# Patient Record
Sex: FEMALE | Race: WHITE | NOT HISPANIC OR LATINO | Employment: OTHER | URBAN - METROPOLITAN AREA
[De-identification: names, ages, dates, MRNs, and addresses within clinical notes are randomized per-mention and may not be internally consistent; named-entity substitution may affect disease eponyms.]

---

## 2017-01-01 ENCOUNTER — APPOINTMENT (OUTPATIENT)
Dept: GENERAL RADIOLOGY | Facility: HOSPITAL | Age: 82
End: 2017-01-01

## 2017-01-01 ENCOUNTER — APPOINTMENT (OUTPATIENT)
Dept: MRI IMAGING | Facility: HOSPITAL | Age: 82
End: 2017-01-01

## 2017-01-01 ENCOUNTER — ANESTHESIA EVENT (OUTPATIENT)
Dept: PERIOP | Facility: HOSPITAL | Age: 82
End: 2017-01-01

## 2017-01-01 ENCOUNTER — APPOINTMENT (OUTPATIENT)
Dept: CARDIOLOGY | Facility: HOSPITAL | Age: 82
End: 2017-01-01
Attending: SURGERY

## 2017-01-01 ENCOUNTER — APPOINTMENT (OUTPATIENT)
Dept: CT IMAGING | Facility: HOSPITAL | Age: 82
End: 2017-01-01

## 2017-01-01 ENCOUNTER — APPOINTMENT (OUTPATIENT)
Dept: NEUROLOGY | Facility: HOSPITAL | Age: 82
End: 2017-01-01
Attending: INTERNAL MEDICINE

## 2017-01-01 ENCOUNTER — ANESTHESIA (OUTPATIENT)
Dept: PERIOP | Facility: HOSPITAL | Age: 82
End: 2017-01-01

## 2017-01-01 ENCOUNTER — APPOINTMENT (OUTPATIENT)
Dept: GENERAL RADIOLOGY | Facility: HOSPITAL | Age: 82
End: 2017-01-01
Attending: PSYCHIATRY & NEUROLOGY

## 2017-01-01 ENCOUNTER — HOSPITAL ENCOUNTER (INPATIENT)
Facility: HOSPITAL | Age: 82
LOS: 7 days | End: 2017-11-06
Attending: EMERGENCY MEDICINE | Admitting: INTERNAL MEDICINE

## 2017-01-01 VITALS
HEIGHT: 64 IN | TEMPERATURE: 98.2 F | OXYGEN SATURATION: 88 % | DIASTOLIC BLOOD PRESSURE: 69 MMHG | SYSTOLIC BLOOD PRESSURE: 120 MMHG | BODY MASS INDEX: 25.33 KG/M2 | WEIGHT: 148.37 LBS

## 2017-01-01 DIAGNOSIS — A41.9 SEPSIS, DUE TO UNSPECIFIED ORGANISM: ICD-10-CM

## 2017-01-01 DIAGNOSIS — N17.9 ACUTE RENAL FAILURE, UNSPECIFIED ACUTE RENAL FAILURE TYPE (HCC): ICD-10-CM

## 2017-01-01 DIAGNOSIS — J96.01 ACUTE RESPIRATORY FAILURE WITH HYPOXIA (HCC): Primary | ICD-10-CM

## 2017-01-01 DIAGNOSIS — K43.6 INCARCERATED VENTRAL HERNIA: ICD-10-CM

## 2017-01-01 DIAGNOSIS — R53.1 GENERALIZED WEAKNESS: ICD-10-CM

## 2017-01-01 LAB
ALBUMIN SERPL-MCNC: 2.3 G/DL (ref 3.5–5.2)
ALBUMIN SERPL-MCNC: 2.3 G/DL (ref 3.5–5.2)
ALBUMIN SERPL-MCNC: 2.4 G/DL (ref 3.5–5.2)
ALBUMIN SERPL-MCNC: 2.5 G/DL (ref 3.5–5.2)
ALBUMIN SERPL-MCNC: 3.7 G/DL (ref 3.5–5.2)
ALBUMIN/GLOB SERPL: 0.7 G/DL
ALBUMIN/GLOB SERPL: 0.9 G/DL
ALBUMIN/GLOB SERPL: 1 G/DL
ALP SERPL-CCNC: 50 U/L (ref 39–117)
ALP SERPL-CCNC: 53 U/L (ref 39–117)
ALP SERPL-CCNC: 53 U/L (ref 39–117)
ALP SERPL-CCNC: 64 U/L (ref 39–117)
ALP SERPL-CCNC: 79 U/L (ref 39–117)
ALT SERPL W P-5'-P-CCNC: 124 U/L (ref 1–33)
ALT SERPL W P-5'-P-CCNC: 239 U/L (ref 1–33)
ALT SERPL W P-5'-P-CCNC: 36 U/L (ref 1–33)
ALT SERPL W P-5'-P-CCNC: 48 U/L (ref 1–33)
ALT SERPL W P-5'-P-CCNC: 69 U/L (ref 1–33)
ANION GAP SERPL CALCULATED.3IONS-SCNC: 13.7 MMOL/L
ANION GAP SERPL CALCULATED.3IONS-SCNC: 15 MMOL/L
ANION GAP SERPL CALCULATED.3IONS-SCNC: 18.1 MMOL/L
ANION GAP SERPL CALCULATED.3IONS-SCNC: 19.5 MMOL/L
ANION GAP SERPL CALCULATED.3IONS-SCNC: 21.4 MMOL/L
APPEARANCE CSF: CLEAR
ARTERIAL PATENCY WRIST A: ABNORMAL
ARTERIAL PATENCY WRIST A: POSITIVE
ARTERIAL PATENCY WRIST A: POSITIVE
AST SERPL-CCNC: 107 U/L (ref 1–32)
AST SERPL-CCNC: 28 U/L (ref 1–32)
AST SERPL-CCNC: 31 U/L (ref 1–32)
AST SERPL-CCNC: 36 U/L (ref 1–32)
AST SERPL-CCNC: 37 U/L (ref 1–32)
ATMOSPHERIC PRESS: 744.8 MMHG
ATMOSPHERIC PRESS: 750.4 MMHG
ATMOSPHERIC PRESS: 753.9 MMHG
BACTERIA BLD CULT: ABNORMAL
BACTERIA SPEC AEROBE CULT: ABNORMAL
BACTERIA SPEC AEROBE CULT: NORMAL
BACTERIA SPEC AEROBE CULT: NORMAL
BACTERIA UR QL AUTO: ABNORMAL /HPF
BACTERIA UR QL AUTO: ABNORMAL /HPF
BASE EXCESS BLDA CALC-SCNC: -3.3 MMOL/L (ref 0–2)
BASE EXCESS BLDA CALC-SCNC: -4.8 MMOL/L (ref 0–2)
BASE EXCESS BLDA CALC-SCNC: 6 MMOL/L (ref -5–5)
BASE EXCESS BLDA CALC-SCNC: 7.7 MMOL/L (ref 0–2)
BASOPHILS # BLD AUTO: 0.01 10*3/MM3 (ref 0–0.2)
BASOPHILS # BLD AUTO: 0.02 10*3/MM3 (ref 0–0.2)
BASOPHILS # BLD AUTO: 0.04 10*3/MM3 (ref 0–0.2)
BASOPHILS NFR BLD AUTO: 0.1 % (ref 0–1.5)
BASOPHILS NFR BLD AUTO: 0.2 % (ref 0–1.5)
BASOPHILS NFR BLD AUTO: 0.4 % (ref 0–1.5)
BDY SITE: ABNORMAL
BH CV ECHO MEAS - AO MEAN PG (FULL): 14 MMHG
BH CV ECHO MEAS - AO MEAN PG: 18 MMHG
BH CV ECHO MEAS - AO ROOT AREA (BSA CORRECTED): 1.9
BH CV ECHO MEAS - AO ROOT AREA: 8.6 CM^2
BH CV ECHO MEAS - AO ROOT DIAM: 3.3 CM
BH CV ECHO MEAS - AO V2 MEAN: 187 CM/SEC
BH CV ECHO MEAS - AO V2 VTI: 24.2 CM
BH CV ECHO MEAS - ASC AORTA: 3.7 CM
BH CV ECHO MEAS - AVA(I,A): 1.9 CM^2
BH CV ECHO MEAS - AVA(I,D): 1.9 CM^2
BH CV ECHO MEAS - BSA(HAYCOCK): 1.7 M^2
BH CV ECHO MEAS - BSA: 1.7 M^2
BH CV ECHO MEAS - BZI_BMI: 24.9 KILOGRAMS/M^2
BH CV ECHO MEAS - BZI_METRIC_HEIGHT: 162.6 CM
BH CV ECHO MEAS - BZI_METRIC_WEIGHT: 65.8 KG
BH CV ECHO MEAS - CONTRAST EF (2CH): 71.4 ML/M^2
BH CV ECHO MEAS - CONTRAST EF 4CH: 68.6 ML/M^2
BH CV ECHO MEAS - EDV(CUBED): 29.8 ML
BH CV ECHO MEAS - EDV(MOD-SP2): 42 ML
BH CV ECHO MEAS - EDV(MOD-SP4): 51 ML
BH CV ECHO MEAS - EDV(TEICH): 37.9 ML
BH CV ECHO MEAS - EF(CUBED): 83.5 %
BH CV ECHO MEAS - EF(MOD-SP2): 71.4 %
BH CV ECHO MEAS - EF(MOD-SP4): 68.6 %
BH CV ECHO MEAS - EF(TEICH): 77.9 %
BH CV ECHO MEAS - ESV(CUBED): 4.9 ML
BH CV ECHO MEAS - ESV(MOD-SP2): 12 ML
BH CV ECHO MEAS - ESV(MOD-SP4): 16 ML
BH CV ECHO MEAS - ESV(TEICH): 8.4 ML
BH CV ECHO MEAS - FS: 45.2 %
BH CV ECHO MEAS - IVS/LVPW: 1.1
BH CV ECHO MEAS - IVSD: 1.1 CM
BH CV ECHO MEAS - LAT PEAK E' VEL: 6 CM/SEC
BH CV ECHO MEAS - LV DIASTOLIC VOL/BSA (35-75): 29.9 ML/M^2
BH CV ECHO MEAS - LV MASS(C)D: 92.8 GRAMS
BH CV ECHO MEAS - LV MASS(C)DI: 54.4 GRAMS/M^2
BH CV ECHO MEAS - LV MEAN PG: 4 MMHG
BH CV ECHO MEAS - LV SYSTOLIC VOL/BSA (12-30): 9.4 ML/M^2
BH CV ECHO MEAS - LV V1 MEAN: 96.1 CM/SEC
BH CV ECHO MEAS - LV V1 VTI: 14.4 CM
BH CV ECHO MEAS - LVIDD: 3.1 CM
BH CV ECHO MEAS - LVIDS: 1.7 CM
BH CV ECHO MEAS - LVLD AP2: 8 CM
BH CV ECHO MEAS - LVLD AP4: 7.3 CM
BH CV ECHO MEAS - LVLS AP2: 6.1 CM
BH CV ECHO MEAS - LVLS AP4: 5.9 CM
BH CV ECHO MEAS - LVOT AREA (M): 3.1 CM^2
BH CV ECHO MEAS - LVOT AREA: 3.1 CM^2
BH CV ECHO MEAS - LVOT DIAM: 2 CM
BH CV ECHO MEAS - LVPWD: 1 CM
BH CV ECHO MEAS - MED PEAK E' VEL: 3 CM/SEC
BH CV ECHO MEAS - MV A DUR: 0.09 SEC
BH CV ECHO MEAS - MV A MAX VEL: 116 CM/SEC
BH CV ECHO MEAS - MV DEC SLOPE: 200 CM/SEC^2
BH CV ECHO MEAS - MV DEC TIME: 0.18 SEC
BH CV ECHO MEAS - MV E MAX VEL: 41 CM/SEC
BH CV ECHO MEAS - MV E/A: 0.35
BH CV ECHO MEAS - MV MEAN PG: 3 MMHG
BH CV ECHO MEAS - MV P1/2T MAX VEL: 51.6 CM/SEC
BH CV ECHO MEAS - MV P1/2T: 75.6 MSEC
BH CV ECHO MEAS - MV V2 MEAN: 79 CM/SEC
BH CV ECHO MEAS - MV V2 VTI: 20.5 CM
BH CV ECHO MEAS - MVA P1/2T LCG: 4.3 CM^2
BH CV ECHO MEAS - MVA(P1/2T): 2.9 CM^2
BH CV ECHO MEAS - MVA(VTI): 2.2 CM^2
BH CV ECHO MEAS - SI(AO): 121.3 ML/M^2
BH CV ECHO MEAS - SI(CUBED): 14.6 ML/M^2
BH CV ECHO MEAS - SI(LVOT): 26.5 ML/M^2
BH CV ECHO MEAS - SI(MOD-SP2): 17.6 ML/M^2
BH CV ECHO MEAS - SI(MOD-SP4): 20.5 ML/M^2
BH CV ECHO MEAS - SI(TEICH): 17.3 ML/M^2
BH CV ECHO MEAS - SV(AO): 207 ML
BH CV ECHO MEAS - SV(CUBED): 24.9 ML
BH CV ECHO MEAS - SV(LVOT): 45.2 ML
BH CV ECHO MEAS - SV(MOD-SP2): 30 ML
BH CV ECHO MEAS - SV(MOD-SP4): 35 ML
BH CV ECHO MEAS - SV(TEICH): 29.5 ML
BH CV XLRA - RV BASE: 1.8 CM
BH CV XLRA - TDI S': 11 CM/SEC
BILIRUB SERPL-MCNC: 0.6 MG/DL (ref 0.1–1.2)
BILIRUB SERPL-MCNC: 0.9 MG/DL (ref 0.1–1.2)
BILIRUB SERPL-MCNC: 1.2 MG/DL (ref 0.1–1.2)
BILIRUB SERPL-MCNC: 1.7 MG/DL (ref 0.1–1.2)
BILIRUB SERPL-MCNC: 1.8 MG/DL (ref 0.1–1.2)
BILIRUB UR QL STRIP: NEGATIVE
BILIRUB UR QL STRIP: NEGATIVE
BUN BLD-MCNC: 69 MG/DL (ref 8–23)
BUN BLD-MCNC: 81 MG/DL (ref 8–23)
BUN BLD-MCNC: 81 MG/DL (ref 8–23)
BUN BLD-MCNC: 83 MG/DL (ref 8–23)
BUN BLD-MCNC: 87 MG/DL (ref 8–23)
BUN/CREAT SERPL: 31.4 (ref 7–25)
BUN/CREAT SERPL: 32.5 (ref 7–25)
BUN/CREAT SERPL: 33.8 (ref 7–25)
BUN/CREAT SERPL: 34.9 (ref 7–25)
BUN/CREAT SERPL: 45.4 (ref 7–25)
CALCIUM SPEC-SCNC: 6.7 MG/DL (ref 8.6–10.5)
CALCIUM SPEC-SCNC: 6.9 MG/DL (ref 8.6–10.5)
CALCIUM SPEC-SCNC: 7.1 MG/DL (ref 8.6–10.5)
CALCIUM SPEC-SCNC: 7.2 MG/DL (ref 8.6–10.5)
CALCIUM SPEC-SCNC: 8.9 MG/DL (ref 8.6–10.5)
CHLORIDE SERPL-SCNC: 102 MMOL/L (ref 98–107)
CHLORIDE SERPL-SCNC: 109 MMOL/L (ref 98–107)
CHLORIDE SERPL-SCNC: 79 MMOL/L (ref 98–107)
CHLORIDE SERPL-SCNC: 91 MMOL/L (ref 98–107)
CHLORIDE SERPL-SCNC: 95 MMOL/L (ref 98–107)
CLARITY UR: ABNORMAL
CLARITY UR: CLEAR
CO2 BLDA-SCNC: 32 MMOL/L (ref 24–29)
CO2 SERPL-SCNC: 20.9 MMOL/L (ref 22–29)
CO2 SERPL-SCNC: 21.3 MMOL/L (ref 22–29)
CO2 SERPL-SCNC: 22.5 MMOL/L (ref 22–29)
CO2 SERPL-SCNC: 27 MMOL/L (ref 22–29)
CO2 SERPL-SCNC: 27.6 MMOL/L (ref 22–29)
COLOR CSF: COLORLESS
COLOR UR: ABNORMAL
COLOR UR: ABNORMAL
CREAT BLD-MCNC: 1.52 MG/DL (ref 0.57–1)
CREAT BLD-MCNC: 2.32 MG/DL (ref 0.57–1)
CREAT BLD-MCNC: 2.4 MG/DL (ref 0.57–1)
CREAT BLD-MCNC: 2.55 MG/DL (ref 0.57–1)
CREAT BLD-MCNC: 2.77 MG/DL (ref 0.57–1)
CREAT UR-MCNC: 204.5 MG/DL
CYTO UR: NORMAL
D DIMER PPP FEU-MCNC: 11.63 MCGFEU/ML (ref 0–0.49)
D-LACTATE SERPL-SCNC: 1.8 MMOL/L (ref 0.5–2)
D-LACTATE SERPL-SCNC: 1.8 MMOL/L (ref 0.5–2)
D-LACTATE SERPL-SCNC: 2.5 MMOL/L (ref 0.5–2)
D-LACTATE SERPL-SCNC: 2.5 MMOL/L (ref 0.5–2)
D-LACTATE SERPL-SCNC: 2.9 MMOL/L (ref 0.5–2)
DEPRECATED RDW RBC AUTO: 40.7 FL (ref 37–54)
DEPRECATED RDW RBC AUTO: 42.9 FL (ref 37–54)
DEPRECATED RDW RBC AUTO: 44.4 FL (ref 37–54)
DEPRECATED RDW RBC AUTO: 45.7 FL (ref 37–54)
DEPRECATED RDW RBC AUTO: 48.5 FL (ref 37–54)
E/E' RATIO: 10
EOSINOPHIL # BLD AUTO: 0 10*3/MM3 (ref 0–0.7)
EOSINOPHIL # BLD AUTO: 0.01 10*3/MM3 (ref 0–0.7)
EOSINOPHIL # BLD AUTO: 0.02 10*3/MM3 (ref 0–0.7)
EOSINOPHIL NFR BLD AUTO: 0 % (ref 0.3–6.2)
EOSINOPHIL NFR BLD AUTO: 0.1 % (ref 0.3–6.2)
EOSINOPHIL NFR BLD AUTO: 0.2 % (ref 0.3–6.2)
ERYTHROCYTE [DISTWIDTH] IN BLOOD BY AUTOMATED COUNT: 12.3 % (ref 11.7–13)
ERYTHROCYTE [DISTWIDTH] IN BLOOD BY AUTOMATED COUNT: 12.5 % (ref 11.7–13)
ERYTHROCYTE [DISTWIDTH] IN BLOOD BY AUTOMATED COUNT: 12.9 % (ref 11.7–13)
ERYTHROCYTE [DISTWIDTH] IN BLOOD BY AUTOMATED COUNT: 13.2 % (ref 11.7–13)
ERYTHROCYTE [DISTWIDTH] IN BLOOD BY AUTOMATED COUNT: 13.6 % (ref 11.7–13)
GFR SERPL CREATININE-BSD FRML MDRD: 16 ML/MIN/1.73
GFR SERPL CREATININE-BSD FRML MDRD: 18 ML/MIN/1.73
GFR SERPL CREATININE-BSD FRML MDRD: 19 ML/MIN/1.73
GFR SERPL CREATININE-BSD FRML MDRD: 20 ML/MIN/1.73
GFR SERPL CREATININE-BSD FRML MDRD: 22 ML/MIN/1.73
GFR SERPL CREATININE-BSD FRML MDRD: 33 ML/MIN/1.73
GLOBULIN UR ELPH-MCNC: 2.9 GM/DL
GLOBULIN UR ELPH-MCNC: 3.4 GM/DL
GLOBULIN UR ELPH-MCNC: 3.4 GM/DL
GLOBULIN UR ELPH-MCNC: 3.5 GM/DL
GLOBULIN UR ELPH-MCNC: 3.7 GM/DL
GLUCOSE BLD-MCNC: 111 MG/DL (ref 65–99)
GLUCOSE BLD-MCNC: 120 MG/DL (ref 65–99)
GLUCOSE BLD-MCNC: 137 MG/DL (ref 65–99)
GLUCOSE BLD-MCNC: 138 MG/DL (ref 65–99)
GLUCOSE BLD-MCNC: 250 MG/DL (ref 65–99)
GLUCOSE BLDC GLUCOMTR-MCNC: 102 MG/DL (ref 70–130)
GLUCOSE BLDC GLUCOMTR-MCNC: 103 MG/DL (ref 70–130)
GLUCOSE BLDC GLUCOMTR-MCNC: 104 MG/DL (ref 70–130)
GLUCOSE BLDC GLUCOMTR-MCNC: 104 MG/DL (ref 70–130)
GLUCOSE BLDC GLUCOMTR-MCNC: 106 MG/DL (ref 70–130)
GLUCOSE BLDC GLUCOMTR-MCNC: 107 MG/DL (ref 70–130)
GLUCOSE BLDC GLUCOMTR-MCNC: 114 MG/DL (ref 70–130)
GLUCOSE BLDC GLUCOMTR-MCNC: 117 MG/DL (ref 70–130)
GLUCOSE BLDC GLUCOMTR-MCNC: 119 MG/DL (ref 70–130)
GLUCOSE BLDC GLUCOMTR-MCNC: 121 MG/DL (ref 70–130)
GLUCOSE BLDC GLUCOMTR-MCNC: 121 MG/DL (ref 70–130)
GLUCOSE BLDC GLUCOMTR-MCNC: 122 MG/DL (ref 70–130)
GLUCOSE BLDC GLUCOMTR-MCNC: 127 MG/DL (ref 70–130)
GLUCOSE BLDC GLUCOMTR-MCNC: 129 MG/DL (ref 70–130)
GLUCOSE BLDC GLUCOMTR-MCNC: 130 MG/DL (ref 70–130)
GLUCOSE BLDC GLUCOMTR-MCNC: 131 MG/DL (ref 70–130)
GLUCOSE BLDC GLUCOMTR-MCNC: 135 MG/DL (ref 70–130)
GLUCOSE BLDC GLUCOMTR-MCNC: 137 MG/DL (ref 70–130)
GLUCOSE BLDC GLUCOMTR-MCNC: 143 MG/DL (ref 70–130)
GLUCOSE BLDC GLUCOMTR-MCNC: 174 MG/DL (ref 70–130)
GLUCOSE BLDC GLUCOMTR-MCNC: 181 MG/DL (ref 70–130)
GLUCOSE BLDC GLUCOMTR-MCNC: 93 MG/DL (ref 70–130)
GLUCOSE BLDC GLUCOMTR-MCNC: 96 MG/DL (ref 70–130)
GLUCOSE CSF-MCNC: 90 MG/DL (ref 40–70)
GLUCOSE UR STRIP-MCNC: NEGATIVE MG/DL
GLUCOSE UR STRIP-MCNC: NEGATIVE MG/DL
GRAM STN SPEC: ABNORMAL
GRAM STN SPEC: NORMAL
HBA1C MFR BLD: 5.7 % (ref 4.8–5.6)
HCO3 BLDA-SCNC: 18.4 MMOL/L (ref 22–28)
HCO3 BLDA-SCNC: 19.7 MMOL/L (ref 22–28)
HCO3 BLDA-SCNC: 30 MMOL/L (ref 22–28)
HCO3 BLDA-SCNC: 30.8 MMOL/L (ref 22–26)
HCT VFR BLD AUTO: 42.1 % (ref 35.6–45.5)
HCT VFR BLD AUTO: 42.3 % (ref 35.6–45.5)
HCT VFR BLD AUTO: 45.5 % (ref 35.6–45.5)
HCT VFR BLD AUTO: 51.1 % (ref 35.6–45.5)
HCT VFR BLD AUTO: 57.4 % (ref 35.6–45.5)
HCT VFR BLDA CALC: 45 % (ref 38–51)
HGB BLD-MCNC: 12.9 G/DL (ref 11.9–15.5)
HGB BLD-MCNC: 13.6 G/DL (ref 11.9–15.5)
HGB BLD-MCNC: 14.9 G/DL (ref 11.9–15.5)
HGB BLD-MCNC: 16.9 G/DL (ref 11.9–15.5)
HGB BLD-MCNC: 19.6 G/DL (ref 11.9–15.5)
HGB BLDA-MCNC: 15.3 G/DL (ref 12–17)
HGB UR QL STRIP.AUTO: ABNORMAL
HGB UR QL STRIP.AUTO: ABNORMAL
HOLD SPECIMEN: NORMAL
HOROWITZ INDEX BLD+IHG-RTO: 100 %
HOROWITZ INDEX BLD+IHG-RTO: 100 %
HOROWITZ INDEX BLD+IHG-RTO: 40 %
HYALINE CASTS UR QL AUTO: ABNORMAL /LPF
HYALINE CASTS UR QL AUTO: ABNORMAL /LPF
IMM GRANULOCYTES # BLD: 0.03 10*3/MM3 (ref 0–0.03)
IMM GRANULOCYTES # BLD: 0.05 10*3/MM3 (ref 0–0.03)
IMM GRANULOCYTES # BLD: 0.06 10*3/MM3 (ref 0–0.03)
IMM GRANULOCYTES NFR BLD: 0.3 % (ref 0–0.5)
IMM GRANULOCYTES NFR BLD: 0.4 % (ref 0–0.5)
IMM GRANULOCYTES NFR BLD: 0.5 % (ref 0–0.5)
INR PPP: 1.08 (ref 0.9–1.1)
ISOLATED FROM: ABNORMAL
KETONES UR QL STRIP: ABNORMAL
KETONES UR QL STRIP: ABNORMAL
LAB AP CASE REPORT: NORMAL
LEFT ATRIUM VOLUME INDEX: 12 ML/M2
LEUKOCYTE ESTERASE UR QL STRIP.AUTO: ABNORMAL
LEUKOCYTE ESTERASE UR QL STRIP.AUTO: NEGATIVE
LYMPHOCYTES # BLD AUTO: 0.44 10*3/MM3 (ref 0.9–4.8)
LYMPHOCYTES # BLD AUTO: 0.84 10*3/MM3 (ref 0.9–4.8)
LYMPHOCYTES # BLD AUTO: 1 10*3/MM3 (ref 0.9–4.8)
LYMPHOCYTES NFR BLD AUTO: 4.6 % (ref 19.6–45.3)
LYMPHOCYTES NFR BLD AUTO: 7 % (ref 19.6–45.3)
LYMPHOCYTES NFR BLD AUTO: 7.5 % (ref 19.6–45.3)
Lab: NORMAL
MAGNESIUM SERPL-MCNC: 4 MG/DL (ref 1.6–2.4)
MAGNESIUM SERPL-MCNC: 5.5 MG/DL (ref 1.6–2.4)
MAGNESIUM SERPL-MCNC: 5.5 MG/DL (ref 1.6–2.4)
MAXIMAL PREDICTED HEART RATE: 138 BPM
MCH RBC QN AUTO: 30 PG (ref 26.9–32)
MCH RBC QN AUTO: 30.4 PG (ref 26.9–32)
MCH RBC QN AUTO: 30.7 PG (ref 26.9–32)
MCH RBC QN AUTO: 30.8 PG (ref 26.9–32)
MCH RBC QN AUTO: 31 PG (ref 26.9–32)
MCHC RBC AUTO-ENTMCNC: 30.6 G/DL (ref 32.4–36.3)
MCHC RBC AUTO-ENTMCNC: 32.2 G/DL (ref 32.4–36.3)
MCHC RBC AUTO-ENTMCNC: 32.7 G/DL (ref 32.4–36.3)
MCHC RBC AUTO-ENTMCNC: 33.1 G/DL (ref 32.4–36.3)
MCHC RBC AUTO-ENTMCNC: 34.1 G/DL (ref 32.4–36.3)
MCV RBC AUTO: 90.8 FL (ref 80.5–98.2)
MCV RBC AUTO: 93.2 FL (ref 80.5–98.2)
MCV RBC AUTO: 93.8 FL (ref 80.5–98.2)
MCV RBC AUTO: 94.4 FL (ref 80.5–98.2)
MCV RBC AUTO: 97.9 FL (ref 80.5–98.2)
METHOD: ABNORMAL
MODALITY: ABNORMAL
MONOCYTES # BLD AUTO: 0.93 10*3/MM3 (ref 0.2–1.2)
MONOCYTES # BLD AUTO: 1.03 10*3/MM3 (ref 0.2–1.2)
MONOCYTES # BLD AUTO: 1.19 10*3/MM3 (ref 0.2–1.2)
MONOCYTES NFR BLD AUTO: 12.5 % (ref 5–12)
MONOCYTES NFR BLD AUTO: 7 % (ref 5–12)
MONOCYTES NFR BLD AUTO: 8.6 % (ref 5–12)
NEUTROPHILS # BLD AUTO: 10.01 10*3/MM3 (ref 1.9–8.1)
NEUTROPHILS # BLD AUTO: 11.31 10*3/MM3 (ref 1.9–8.1)
NEUTROPHILS # BLD AUTO: 7.83 10*3/MM3 (ref 1.9–8.1)
NEUTROPHILS NFR BLD AUTO: 82.1 % (ref 42.7–76)
NEUTROPHILS NFR BLD AUTO: 83.8 % (ref 42.7–76)
NEUTROPHILS NFR BLD AUTO: 84.7 % (ref 42.7–76)
NITRITE UR QL STRIP: NEGATIVE
NITRITE UR QL STRIP: NEGATIVE
NRBC BLD MANUAL-RTO: 0 /100 WBC (ref 0–0)
NT-PROBNP SERPL-MCNC: 3439 PG/ML (ref 0–1800)
NUC CELL # CSF MANUAL: 0 /MM3 (ref 0–5)
O2 A-A PPRESDIFF RESPIRATORY: 0.1 MMHG
O2 A-A PPRESDIFF RESPIRATORY: 0.3 MMHG
O2 A-A PPRESDIFF RESPIRATORY: 0.3 MMHG
PATH REPORT.FINAL DX SPEC: NORMAL
PATH REPORT.GROSS SPEC: NORMAL
PCO2 BLDA: 27.4 MM HG (ref 35–45)
PCO2 BLDA: 29.7 MM HG (ref 35–45)
PCO2 BLDA: 34.3 MM HG (ref 35–45)
PCO2 BLDA: 47.6 MM HG (ref 35–45)
PEEP RESPIRATORY: 5 CM[H2O]
PEEP RESPIRATORY: 7 CM[H2O]
PH BLDA: 7.42 PH UNITS (ref 7.35–7.6)
PH BLDA: 7.43 PH UNITS (ref 7.35–7.45)
PH BLDA: 7.43 PH UNITS (ref 7.35–7.45)
PH BLDA: 7.55 PH UNITS (ref 7.35–7.45)
PH UR STRIP.AUTO: 5.5 [PH] (ref 5–8)
PH UR STRIP.AUTO: <=5 [PH] (ref 5–8)
PHENYTOIN SERPL-MCNC: <0.8 MCG/ML (ref 10–20)
PHOSPHATE SERPL-MCNC: 3.7 MG/DL (ref 2.5–4.5)
PHOSPHATE SERPL-MCNC: 4.6 MG/DL (ref 2.5–4.5)
PHOSPHATE SERPL-MCNC: 4.7 MG/DL (ref 2.5–4.5)
PLATELET # BLD AUTO: 101 10*3/MM3 (ref 140–500)
PLATELET # BLD AUTO: 107 10*3/MM3 (ref 140–500)
PLATELET # BLD AUTO: 123 10*3/MM3 (ref 140–500)
PLATELET # BLD AUTO: 126 10*3/MM3 (ref 140–500)
PLATELET # BLD AUTO: 135 10*3/MM3 (ref 140–500)
PMV BLD AUTO: 13.4 FL (ref 6–12)
PMV BLD AUTO: 13.5 FL (ref 6–12)
PMV BLD AUTO: 14.3 FL (ref 6–12)
PMV BLD AUTO: ABNORMAL FL (ref 6–12)
PO2 BLDA: 218.8 MM HG (ref 80–100)
PO2 BLDA: 329 MMHG (ref 80–105)
PO2 BLDA: 50.6 MM HG (ref 80–100)
PO2 BLDA: 75 MM HG (ref 80–100)
POTASSIUM BLD-SCNC: 3.4 MMOL/L (ref 3.5–5.2)
POTASSIUM BLD-SCNC: 3.5 MMOL/L (ref 3.5–5.2)
POTASSIUM BLD-SCNC: 3.6 MMOL/L (ref 3.5–5.2)
POTASSIUM BLD-SCNC: 3.6 MMOL/L (ref 3.5–5.2)
POTASSIUM BLD-SCNC: 3.9 MMOL/L (ref 3.5–5.2)
POTASSIUM BLD-SCNC: 4.2 MMOL/L (ref 3.5–5.2)
POTASSIUM BLDA-SCNC: 3.4 MMOL/L (ref 3.5–4.9)
PROCALCITONIN SERPL-MCNC: 10.56 NG/ML (ref 0.1–0.25)
PROT CSF-MCNC: 60 MG/DL (ref 15–45)
PROT SERPL-MCNC: 5.4 G/DL (ref 6–8.5)
PROT SERPL-MCNC: 5.7 G/DL (ref 6–8.5)
PROT SERPL-MCNC: 5.7 G/DL (ref 6–8.5)
PROT SERPL-MCNC: 5.9 G/DL (ref 6–8.5)
PROT SERPL-MCNC: 7.4 G/DL (ref 6–8.5)
PROT UR QL STRIP: ABNORMAL
PROT UR QL STRIP: ABNORMAL
PROTHROMBIN TIME: 13.6 SECONDS (ref 11.7–14.2)
RBC # BLD AUTO: 4.3 10*6/MM3 (ref 3.9–5.2)
RBC # BLD AUTO: 4.48 10*6/MM3 (ref 3.9–5.2)
RBC # BLD AUTO: 4.85 10*6/MM3 (ref 3.9–5.2)
RBC # BLD AUTO: 5.48 10*6/MM3 (ref 3.9–5.2)
RBC # BLD AUTO: 6.32 10*6/MM3 (ref 3.9–5.2)
RBC # CSF MANUAL: 7 /MM3 (ref 0–0)
RBC # UR: ABNORMAL /HPF
RBC # UR: ABNORMAL /HPF
REF LAB TEST METHOD: ABNORMAL
REF LAB TEST METHOD: ABNORMAL
SAO2 % BLDA: 100 % (ref 95–98)
SAO2 % BLDCOA: 90.1 % (ref 92–99)
SAO2 % BLDCOA: 95.6 % (ref 92–99)
SAO2 % BLDCOA: 99.8 % (ref 92–99)
SET MECH RESP RATE: 12
SET MECH RESP RATE: 8
SODIUM BLD-SCNC: 128 MMOL/L (ref 136–145)
SODIUM BLD-SCNC: 133 MMOL/L (ref 136–145)
SODIUM BLD-SCNC: 137 MMOL/L (ref 136–145)
SODIUM BLD-SCNC: 141 MMOL/L (ref 136–145)
SODIUM BLD-SCNC: 144 MMOL/L (ref 136–145)
SODIUM UR-SCNC: <20 MMOL/L
SP GR UR STRIP: 1.02 (ref 1–1.03)
SP GR UR STRIP: >=1.03 (ref 1–1.03)
SQUAMOUS #/AREA URNS HPF: ABNORMAL /HPF
SQUAMOUS #/AREA URNS HPF: ABNORMAL /HPF
STRESS TARGET HR: 117 BPM
TOTAL RATE: 12 BREATHS/MINUTE
TOTAL RATE: 21 BREATHS/MINUTE
TOTAL RATE: 26 BREATHS/MINUTE
TROPONIN T SERPL-MCNC: 0.03 NG/ML (ref 0–0.03)
TROPONIN T SERPL-MCNC: 0.04 NG/ML (ref 0–0.03)
TSH SERPL DL<=0.05 MIU/L-ACNC: 0.54 MIU/ML (ref 0.27–4.2)
TUBE # CSF: 1
UROBILINOGEN UR QL STRIP: ABNORMAL
UROBILINOGEN UR QL STRIP: ABNORMAL
VANCOMYCIN SERPL-MCNC: 13.9 MCG/ML (ref 5–40)
VANCOMYCIN SERPL-MCNC: 18.4 MCG/ML (ref 5–40)
VANCOMYCIN SERPL-MCNC: 19.5 MCG/ML (ref 5–40)
VANCOMYCIN SERPL-MCNC: 24.9 MCG/ML (ref 5–40)
VENTILATOR MODE: ABNORMAL
VENTILATOR MODE: AC
VIT B12 BLD-MCNC: 692 PG/ML (ref 211–946)
VT ON VENT VENT: 433 ML
VT ON VENT VENT: 500 ML
WBC NRBC COR # BLD: 11.95 10*3/MM3 (ref 4.5–10.7)
WBC NRBC COR # BLD: 12.92 10*3/MM3 (ref 4.5–10.7)
WBC NRBC COR # BLD: 13.33 10*3/MM3 (ref 4.5–10.7)
WBC NRBC COR # BLD: 9.54 10*3/MM3 (ref 4.5–10.7)
WBC NRBC COR # BLD: 9.88 10*3/MM3 (ref 4.5–10.7)
WBC UR QL AUTO: ABNORMAL /HPF
WBC UR QL AUTO: ABNORMAL /HPF
WHOLE BLOOD HOLD SPECIMEN: NORMAL
WHOLE BLOOD HOLD SPECIMEN: NORMAL

## 2017-01-01 PROCEDURE — 84484 ASSAY OF TROPONIN QUANT: CPT | Performed by: EMERGENCY MEDICINE

## 2017-01-01 PROCEDURE — 82570 ASSAY OF URINE CREATININE: CPT | Performed by: INTERNAL MEDICINE

## 2017-01-01 PROCEDURE — 99024 POSTOP FOLLOW-UP VISIT: CPT | Performed by: SURGERY

## 2017-01-01 PROCEDURE — 25010000002 FENTANYL CITRATE (PF) 100 MCG/2ML SOLUTION: Performed by: INTERNAL MEDICINE

## 2017-01-01 PROCEDURE — 83605 ASSAY OF LACTIC ACID: CPT | Performed by: INTERNAL MEDICINE

## 2017-01-01 PROCEDURE — 82803 BLOOD GASES ANY COMBINATION: CPT

## 2017-01-01 PROCEDURE — 99221 1ST HOSP IP/OBS SF/LOW 40: CPT | Performed by: SURGERY

## 2017-01-01 PROCEDURE — 80053 COMPREHEN METABOLIC PANEL: CPT | Performed by: INTERNAL MEDICINE

## 2017-01-01 PROCEDURE — 77003 FLUOROGUIDE FOR SPINE INJECT: CPT

## 2017-01-01 PROCEDURE — 80185 ASSAY OF PHENYTOIN TOTAL: CPT | Performed by: EMERGENCY MEDICINE

## 2017-01-01 PROCEDURE — 36600 WITHDRAWAL OF ARTERIAL BLOOD: CPT

## 2017-01-01 PROCEDURE — 94760 N-INVAS EAR/PLS OXIMETRY 1: CPT

## 2017-01-01 PROCEDURE — 25010000002 HEPARIN (PORCINE) PER 1000 UNITS: Performed by: INTERNAL MEDICINE

## 2017-01-01 PROCEDURE — 94799 UNLISTED PULMONARY SVC/PX: CPT

## 2017-01-01 PROCEDURE — 87015 SPECIMEN INFECT AGNT CONCNTJ: CPT | Performed by: PSYCHIATRY & NEUROLOGY

## 2017-01-01 PROCEDURE — 71010 HC CHEST PA OR AP: CPT

## 2017-01-01 PROCEDURE — 94003 VENT MGMT INPAT SUBQ DAY: CPT

## 2017-01-01 PROCEDURE — 49561 PR REPAIR INCISIONAL HERNIA,STRANG: CPT | Performed by: PHYSICIAN ASSISTANT

## 2017-01-01 PROCEDURE — 25010000003 POTASSIUM CHLORIDE PER 2 MEQ: Performed by: INTERNAL MEDICINE

## 2017-01-01 PROCEDURE — 84100 ASSAY OF PHOSPHORUS: CPT | Performed by: INTERNAL MEDICINE

## 2017-01-01 PROCEDURE — 93010 ELECTROCARDIOGRAM REPORT: CPT | Performed by: INTERNAL MEDICINE

## 2017-01-01 PROCEDURE — 44120 REMOVAL OF SMALL INTESTINE: CPT | Performed by: PHYSICIAN ASSISTANT

## 2017-01-01 PROCEDURE — 25010000002 VANCOMYCIN: Performed by: INTERNAL MEDICINE

## 2017-01-01 PROCEDURE — 93005 ELECTROCARDIOGRAM TRACING: CPT | Performed by: INTERNAL MEDICINE

## 2017-01-01 PROCEDURE — 80202 ASSAY OF VANCOMYCIN: CPT | Performed by: INTERNAL MEDICINE

## 2017-01-01 PROCEDURE — 84132 ASSAY OF SERUM POTASSIUM: CPT | Performed by: INTERNAL MEDICINE

## 2017-01-01 PROCEDURE — 25010000002 VANCOMYCIN PER 500 MG: Performed by: INTERNAL MEDICINE

## 2017-01-01 PROCEDURE — 94660 CPAP INITIATION&MGMT: CPT

## 2017-01-01 PROCEDURE — 84145 PROCALCITONIN (PCT): CPT | Performed by: EMERGENCY MEDICINE

## 2017-01-01 PROCEDURE — 99223 1ST HOSP IP/OBS HIGH 75: CPT | Performed by: PSYCHIATRY & NEUROLOGY

## 2017-01-01 PROCEDURE — 25010000002 PHENYLEPHRINE PER 1 ML: Performed by: ANESTHESIOLOGY

## 2017-01-01 PROCEDURE — 70551 MRI BRAIN STEM W/O DYE: CPT

## 2017-01-01 PROCEDURE — 83735 ASSAY OF MAGNESIUM: CPT | Performed by: INTERNAL MEDICINE

## 2017-01-01 PROCEDURE — 93306 TTE W/DOPPLER COMPLETE: CPT | Performed by: INTERNAL MEDICINE

## 2017-01-01 PROCEDURE — 0DB80ZZ EXCISION OF SMALL INTESTINE, OPEN APPROACH: ICD-10-PCS | Performed by: SURGERY

## 2017-01-01 PROCEDURE — 94002 VENT MGMT INPAT INIT DAY: CPT

## 2017-01-01 PROCEDURE — 87150 DNA/RNA AMPLIFIED PROBE: CPT | Performed by: EMERGENCY MEDICINE

## 2017-01-01 PROCEDURE — 84300 ASSAY OF URINE SODIUM: CPT | Performed by: INTERNAL MEDICINE

## 2017-01-01 PROCEDURE — 87186 SC STD MICRODIL/AGAR DIL: CPT | Performed by: EMERGENCY MEDICINE

## 2017-01-01 PROCEDURE — 82962 GLUCOSE BLOOD TEST: CPT

## 2017-01-01 PROCEDURE — 25010000002 PIPERACILLIN SOD-TAZOBACTAM PER 1 G: Performed by: EMERGENCY MEDICINE

## 2017-01-01 PROCEDURE — 85027 COMPLETE CBC AUTOMATED: CPT | Performed by: INTERNAL MEDICINE

## 2017-01-01 PROCEDURE — 93306 TTE W/DOPPLER COMPLETE: CPT

## 2017-01-01 PROCEDURE — 83880 ASSAY OF NATRIURETIC PEPTIDE: CPT | Performed by: EMERGENCY MEDICINE

## 2017-01-01 PROCEDURE — 97110 THERAPEUTIC EXERCISES: CPT

## 2017-01-01 PROCEDURE — 5A1955Z RESPIRATORY VENTILATION, GREATER THAN 96 CONSECUTIVE HOURS: ICD-10-PCS | Performed by: SURGERY

## 2017-01-01 PROCEDURE — 25010000002 PIPERACILLIN SOD-TAZOBACTAM PER 1 G: Performed by: INTERNAL MEDICINE

## 2017-01-01 PROCEDURE — 84157 ASSAY OF PROTEIN OTHER: CPT | Performed by: PSYCHIATRY & NEUROLOGY

## 2017-01-01 PROCEDURE — 25010000002 MIDAZOLAM PER 1 MG: Performed by: ANESTHESIOLOGY

## 2017-01-01 PROCEDURE — 82947 ASSAY GLUCOSE BLOOD QUANT: CPT

## 2017-01-01 PROCEDURE — 0BH17EZ INSERTION OF ENDOTRACHEAL AIRWAY INTO TRACHEA, VIA NATURAL OR ARTIFICIAL OPENING: ICD-10-PCS | Performed by: ANESTHESIOLOGY

## 2017-01-01 PROCEDURE — 25010000002 LORAZEPAM PER 2 MG: Performed by: INTERNAL MEDICINE

## 2017-01-01 PROCEDURE — 85379 FIBRIN DEGRADATION QUANT: CPT | Performed by: EMERGENCY MEDICINE

## 2017-01-01 PROCEDURE — 36415 COLL VENOUS BLD VENIPUNCTURE: CPT | Performed by: EMERGENCY MEDICINE

## 2017-01-01 PROCEDURE — 25010000002 PROPOFOL 1000 MG/ML EMULSION: Performed by: ANESTHESIOLOGY

## 2017-01-01 PROCEDURE — 85014 HEMATOCRIT: CPT

## 2017-01-01 PROCEDURE — B543ZZA ULTRASONOGRAPHY OF RIGHT JUGULAR VEINS, GUIDANCE: ICD-10-PCS | Performed by: INTERNAL MEDICINE

## 2017-01-01 PROCEDURE — 49561 PR REPAIR INCISIONAL HERNIA,STRANG: CPT | Performed by: SURGERY

## 2017-01-01 PROCEDURE — 84484 ASSAY OF TROPONIN QUANT: CPT | Performed by: INTERNAL MEDICINE

## 2017-01-01 PROCEDURE — 74176 CT ABD & PELVIS W/O CONTRAST: CPT

## 2017-01-01 PROCEDURE — 25010000002 VANCOMYCIN 10 G RECONSTITUTED SOLUTION: Performed by: INTERNAL MEDICINE

## 2017-01-01 PROCEDURE — B01B1ZZ FLUOROSCOPY OF SPINAL CORD USING LOW OSMOLAR CONTRAST: ICD-10-PCS | Performed by: RADIOLOGY

## 2017-01-01 PROCEDURE — 85025 COMPLETE CBC W/AUTO DIFF WBC: CPT | Performed by: INTERNAL MEDICINE

## 2017-01-01 PROCEDURE — 70450 CT HEAD/BRAIN W/O DYE: CPT

## 2017-01-01 PROCEDURE — 25010000002 PROPOFOL 10 MG/ML EMULSION: Performed by: ANESTHESIOLOGY

## 2017-01-01 PROCEDURE — 93005 ELECTROCARDIOGRAM TRACING: CPT | Performed by: EMERGENCY MEDICINE

## 2017-01-01 PROCEDURE — 81001 URINALYSIS AUTO W/SCOPE: CPT | Performed by: INTERNAL MEDICINE

## 2017-01-01 PROCEDURE — 94640 AIRWAY INHALATION TREATMENT: CPT

## 2017-01-01 PROCEDURE — 99285 EMERGENCY DEPT VISIT HI MDM: CPT

## 2017-01-01 PROCEDURE — 36415 COLL VENOUS BLD VENIPUNCTURE: CPT

## 2017-01-01 PROCEDURE — 85018 HEMOGLOBIN: CPT

## 2017-01-01 PROCEDURE — 95816 EEG AWAKE AND DROWSY: CPT

## 2017-01-01 PROCEDURE — 84443 ASSAY THYROID STIM HORMONE: CPT | Performed by: PSYCHIATRY & NEUROLOGY

## 2017-01-01 PROCEDURE — 97162 PT EVAL MOD COMPLEX 30 MIN: CPT

## 2017-01-01 PROCEDURE — 88307 TISSUE EXAM BY PATHOLOGIST: CPT | Performed by: SURGERY

## 2017-01-01 PROCEDURE — 89050 BODY FLUID CELL COUNT: CPT | Performed by: PSYCHIATRY & NEUROLOGY

## 2017-01-01 PROCEDURE — 87147 CULTURE TYPE IMMUNOLOGIC: CPT | Performed by: EMERGENCY MEDICINE

## 2017-01-01 PROCEDURE — 80053 COMPREHEN METABOLIC PANEL: CPT | Performed by: EMERGENCY MEDICINE

## 2017-01-01 PROCEDURE — 87040 BLOOD CULTURE FOR BACTERIA: CPT | Performed by: EMERGENCY MEDICINE

## 2017-01-01 PROCEDURE — 85025 COMPLETE CBC W/AUTO DIFF WBC: CPT | Performed by: EMERGENCY MEDICINE

## 2017-01-01 PROCEDURE — 82945 GLUCOSE OTHER FLUID: CPT | Performed by: PSYCHIATRY & NEUROLOGY

## 2017-01-01 PROCEDURE — 25010000002 FENTANYL CITRATE (PF) 100 MCG/2ML SOLUTION: Performed by: ANESTHESIOLOGY

## 2017-01-01 PROCEDURE — 82607 VITAMIN B-12: CPT | Performed by: PSYCHIATRY & NEUROLOGY

## 2017-01-01 PROCEDURE — 99233 SBSQ HOSP IP/OBS HIGH 50: CPT | Performed by: PSYCHIATRY & NEUROLOGY

## 2017-01-01 PROCEDURE — 05HM33Z INSERTION OF INFUSION DEVICE INTO RIGHT INTERNAL JUGULAR VEIN, PERCUTANEOUS APPROACH: ICD-10-PCS | Performed by: INTERNAL MEDICINE

## 2017-01-01 PROCEDURE — 83605 ASSAY OF LACTIC ACID: CPT | Performed by: EMERGENCY MEDICINE

## 2017-01-01 PROCEDURE — 81001 URINALYSIS AUTO W/SCOPE: CPT | Performed by: EMERGENCY MEDICINE

## 2017-01-01 PROCEDURE — 85610 PROTHROMBIN TIME: CPT | Performed by: EMERGENCY MEDICINE

## 2017-01-01 PROCEDURE — 87070 CULTURE OTHR SPECIMN AEROBIC: CPT | Performed by: PSYCHIATRY & NEUROLOGY

## 2017-01-01 PROCEDURE — 25010000002 PHENYLEPHRINE PER 1 ML

## 2017-01-01 PROCEDURE — 44120 REMOVAL OF SMALL INTESTINE: CPT | Performed by: SURGERY

## 2017-01-01 PROCEDURE — 87086 URINE CULTURE/COLONY COUNT: CPT | Performed by: EMERGENCY MEDICINE

## 2017-01-01 PROCEDURE — 95816 EEG AWAKE AND DROWSY: CPT | Performed by: PSYCHIATRY & NEUROLOGY

## 2017-01-01 PROCEDURE — 99223 1ST HOSP IP/OBS HIGH 75: CPT | Performed by: INTERNAL MEDICINE

## 2017-01-01 PROCEDURE — 83036 HEMOGLOBIN GLYCOSYLATED A1C: CPT | Performed by: INTERNAL MEDICINE

## 2017-01-01 PROCEDURE — 87205 SMEAR GRAM STAIN: CPT | Performed by: PSYCHIATRY & NEUROLOGY

## 2017-01-01 PROCEDURE — 009U3ZX DRAINAGE OF SPINAL CANAL, PERCUTANEOUS APPROACH, DIAGNOSTIC: ICD-10-PCS | Performed by: RADIOLOGY

## 2017-01-01 RX ORDER — PROMETHAZINE HYDROCHLORIDE 25 MG/1
12.5 SUPPOSITORY RECTAL EVERY 6 HOURS PRN
Status: DISCONTINUED | OUTPATIENT
Start: 2017-01-01 | End: 2017-01-01

## 2017-01-01 RX ORDER — TRAMADOL HYDROCHLORIDE 50 MG/1
50 TABLET ORAL EVERY 6 HOURS
Status: DISCONTINUED | OUTPATIENT
Start: 2017-01-01 | End: 2017-01-01

## 2017-01-01 RX ORDER — LORAZEPAM 2 MG/ML
1 CONCENTRATE ORAL
Status: DISCONTINUED | OUTPATIENT
Start: 2017-01-01 | End: 2017-01-01 | Stop reason: HOSPADM

## 2017-01-01 RX ORDER — FENTANYL CITRATE 50 UG/ML
INJECTION, SOLUTION INTRAMUSCULAR; INTRAVENOUS
Status: COMPLETED | OUTPATIENT
Start: 2017-01-01 | End: 2017-01-01

## 2017-01-01 RX ORDER — DEXTROSE MONOHYDRATE 25 G/50ML
25 INJECTION, SOLUTION INTRAVENOUS
Status: DISCONTINUED | OUTPATIENT
Start: 2017-01-01 | End: 2017-01-01 | Stop reason: HOSPADM

## 2017-01-01 RX ORDER — VANCOMYCIN HYDROCHLORIDE 1 G/200ML
1000 INJECTION, SOLUTION INTRAVENOUS ONCE
Status: COMPLETED | OUTPATIENT
Start: 2017-01-01 | End: 2017-01-01

## 2017-01-01 RX ORDER — LORAZEPAM 2 MG/ML
2 INJECTION INTRAMUSCULAR
Status: DISCONTINUED | OUTPATIENT
Start: 2017-01-01 | End: 2017-01-01 | Stop reason: HOSPADM

## 2017-01-01 RX ORDER — SENNA AND DOCUSATE SODIUM 50; 8.6 MG/1; MG/1
2 TABLET, FILM COATED ORAL NIGHTLY
Status: DISCONTINUED | OUTPATIENT
Start: 2017-01-01 | End: 2017-01-01

## 2017-01-01 RX ORDER — LORAZEPAM 2 MG/ML
2 CONCENTRATE ORAL
Status: DISCONTINUED | OUTPATIENT
Start: 2017-01-01 | End: 2017-01-01 | Stop reason: HOSPADM

## 2017-01-01 RX ORDER — LORAZEPAM 2 MG/ML
0.5 INJECTION INTRAMUSCULAR
Status: DISCONTINUED | OUTPATIENT
Start: 2017-01-01 | End: 2017-01-01 | Stop reason: HOSPADM

## 2017-01-01 RX ORDER — GLYCOPYRROLATE 0.2 MG/ML
0.4 INJECTION INTRAMUSCULAR; INTRAVENOUS
Status: DISCONTINUED | OUTPATIENT
Start: 2017-01-01 | End: 2017-01-01 | Stop reason: HOSPADM

## 2017-01-01 RX ORDER — PROMETHAZINE HYDROCHLORIDE 12.5 MG/1
12.5 TABLET ORAL EVERY 6 HOURS PRN
Status: DISCONTINUED | OUTPATIENT
Start: 2017-01-01 | End: 2017-01-01

## 2017-01-01 RX ORDER — NICOTINE POLACRILEX 4 MG
15 LOZENGE BUCCAL
Status: DISCONTINUED | OUTPATIENT
Start: 2017-01-01 | End: 2017-01-01

## 2017-01-01 RX ORDER — CLONAZEPAM 0.5 MG/1
0.25 TABLET ORAL 2 TIMES DAILY PRN
COMMUNITY

## 2017-01-01 RX ORDER — GLYCOPYRROLATE 0.2 MG/ML
0.2 INJECTION INTRAMUSCULAR; INTRAVENOUS
Status: DISCONTINUED | OUTPATIENT
Start: 2017-01-01 | End: 2017-01-01 | Stop reason: HOSPADM

## 2017-01-01 RX ORDER — SCOLOPAMINE TRANSDERMAL SYSTEM 1 MG/1
1 PATCH, EXTENDED RELEASE TRANSDERMAL
Status: DISCONTINUED | OUTPATIENT
Start: 2017-01-01 | End: 2017-01-01 | Stop reason: HOSPADM

## 2017-01-01 RX ORDER — FAMOTIDINE 10 MG/ML
20 INJECTION, SOLUTION INTRAVENOUS EVERY 12 HOURS SCHEDULED
Status: DISCONTINUED | OUTPATIENT
Start: 2017-01-01 | End: 2017-01-01

## 2017-01-01 RX ORDER — ACETAMINOPHEN 650 MG/1
650 SUPPOSITORY RECTAL EVERY 4 HOURS PRN
Status: DISCONTINUED | OUTPATIENT
Start: 2017-01-01 | End: 2017-01-01

## 2017-01-01 RX ORDER — FENTANYL CITRATE 50 UG/ML
25 INJECTION, SOLUTION INTRAMUSCULAR; INTRAVENOUS
Status: DISCONTINUED | OUTPATIENT
Start: 2017-01-01 | End: 2017-01-01 | Stop reason: HOSPADM

## 2017-01-01 RX ORDER — BUPIVACAINE HYDROCHLORIDE AND EPINEPHRINE 5; 5 MG/ML; UG/ML
INJECTION, SOLUTION PERINEURAL AS NEEDED
Status: DISCONTINUED | OUTPATIENT
Start: 2017-01-01 | End: 2017-01-01 | Stop reason: HOSPADM

## 2017-01-01 RX ORDER — PHENYTOIN SODIUM 100 MG/1
200 CAPSULE, EXTENDED RELEASE ORAL EVERY MORNING
COMMUNITY

## 2017-01-01 RX ORDER — LIDOCAINE HYDROCHLORIDE 10 MG/ML
10 INJECTION, SOLUTION INFILTRATION; PERINEURAL ONCE
Status: COMPLETED | OUTPATIENT
Start: 2017-01-01 | End: 2017-01-01

## 2017-01-01 RX ORDER — DIPHENHYDRAMINE HYDROCHLORIDE 50 MG/ML
12.5 INJECTION INTRAMUSCULAR; INTRAVENOUS
Status: DISCONTINUED | OUTPATIENT
Start: 2017-01-01 | End: 2017-01-01 | Stop reason: HOSPADM

## 2017-01-01 RX ORDER — ALBUTEROL SULFATE 90 UG/1
6 AEROSOL, METERED RESPIRATORY (INHALATION)
Status: DISCONTINUED | OUTPATIENT
Start: 2017-01-01 | End: 2017-01-01

## 2017-01-01 RX ORDER — SODIUM CHLORIDE 9 MG/ML
50 INJECTION, SOLUTION INTRAVENOUS CONTINUOUS
Status: DISCONTINUED | OUTPATIENT
Start: 2017-01-01 | End: 2017-01-01

## 2017-01-01 RX ORDER — ACETAMINOPHEN 325 MG/1
650 TABLET ORAL EVERY 4 HOURS PRN
Status: DISCONTINUED | OUTPATIENT
Start: 2017-01-01 | End: 2017-01-01

## 2017-01-01 RX ORDER — SODIUM CHLORIDE 0.9 % (FLUSH) 0.9 %
1-10 SYRINGE (ML) INJECTION AS NEEDED
Status: CANCELLED | OUTPATIENT
Start: 2017-01-01

## 2017-01-01 RX ORDER — LANSOPRAZOLE
30 KIT EVERY MORNING
Status: DISCONTINUED | OUTPATIENT
Start: 2017-01-01 | End: 2017-01-01

## 2017-01-01 RX ORDER — HYDRALAZINE HYDROCHLORIDE 20 MG/ML
5 INJECTION INTRAMUSCULAR; INTRAVENOUS
Status: DISCONTINUED | OUTPATIENT
Start: 2017-01-01 | End: 2017-01-01 | Stop reason: HOSPADM

## 2017-01-01 RX ORDER — MIDAZOLAM HYDROCHLORIDE 1 MG/ML
2 INJECTION INTRAMUSCULAR; INTRAVENOUS
Status: CANCELLED | OUTPATIENT
Start: 2017-01-01

## 2017-01-01 RX ORDER — ACETAMINOPHEN 325 MG/1
650 TABLET ORAL EVERY 4 HOURS PRN
Status: DISCONTINUED | OUTPATIENT
Start: 2017-01-01 | End: 2017-01-01 | Stop reason: HOSPADM

## 2017-01-01 RX ORDER — SODIUM CHLORIDE 0.9 % (FLUSH) 0.9 %
1-10 SYRINGE (ML) INJECTION AS NEEDED
Status: DISCONTINUED | OUTPATIENT
Start: 2017-01-01 | End: 2017-01-01 | Stop reason: HOSPADM

## 2017-01-01 RX ORDER — MIDAZOLAM HYDROCHLORIDE 5 MG/ML
INJECTION INTRAMUSCULAR; INTRAVENOUS
Status: COMPLETED | OUTPATIENT
Start: 2017-01-01 | End: 2017-01-01

## 2017-01-01 RX ORDER — EPHEDRINE SULFATE 50 MG/ML
5 INJECTION, SOLUTION INTRAVENOUS ONCE AS NEEDED
Status: DISCONTINUED | OUTPATIENT
Start: 2017-01-01 | End: 2017-01-01 | Stop reason: HOSPADM

## 2017-01-01 RX ORDER — PROMETHAZINE HYDROCHLORIDE 25 MG/1
25 SUPPOSITORY RECTAL ONCE AS NEEDED
Status: DISCONTINUED | OUTPATIENT
Start: 2017-01-01 | End: 2017-01-01 | Stop reason: HOSPADM

## 2017-01-01 RX ORDER — PROMETHAZINE HYDROCHLORIDE 25 MG/1
25 TABLET ORAL ONCE AS NEEDED
Status: DISCONTINUED | OUTPATIENT
Start: 2017-01-01 | End: 2017-01-01 | Stop reason: HOSPADM

## 2017-01-01 RX ORDER — SODIUM CHLORIDE 0.9 % (FLUSH) 0.9 %
10 SYRINGE (ML) INJECTION AS NEEDED
Status: DISCONTINUED | OUTPATIENT
Start: 2017-01-01 | End: 2017-01-01 | Stop reason: HOSPADM

## 2017-01-01 RX ORDER — LORAZEPAM 2 MG/ML
1 INJECTION INTRAMUSCULAR
Status: DISCONTINUED | OUTPATIENT
Start: 2017-01-01 | End: 2017-01-01 | Stop reason: HOSPADM

## 2017-01-01 RX ORDER — HEPARIN SODIUM 5000 [USP'U]/ML
5000 INJECTION, SOLUTION INTRAVENOUS; SUBCUTANEOUS EVERY 8 HOURS SCHEDULED
Status: DISCONTINUED | OUTPATIENT
Start: 2017-01-01 | End: 2017-01-01

## 2017-01-01 RX ORDER — FENTANYL CITRATE 50 UG/ML
50 INJECTION, SOLUTION INTRAMUSCULAR; INTRAVENOUS ONCE
Status: COMPLETED | OUTPATIENT
Start: 2017-01-01 | End: 2017-01-01

## 2017-01-01 RX ORDER — HEPARIN SODIUM 5000 [USP'U]/ML
5000 INJECTION, SOLUTION INTRAVENOUS; SUBCUTANEOUS EVERY 8 HOURS SCHEDULED
Status: DISCONTINUED | OUTPATIENT
Start: 2017-01-01 | End: 2017-01-01 | Stop reason: ALTCHOICE

## 2017-01-01 RX ORDER — FAMOTIDINE 10 MG/ML
20 INJECTION, SOLUTION INTRAVENOUS ONCE
Status: COMPLETED | OUTPATIENT
Start: 2017-01-01 | End: 2017-01-01

## 2017-01-01 RX ORDER — PROPOFOL 10 MG/ML
VIAL (ML) INTRAVENOUS
Status: DISPENSED
Start: 2017-01-01 | End: 2017-01-01

## 2017-01-01 RX ORDER — NALOXONE HCL 0.4 MG/ML
0.2 VIAL (ML) INJECTION AS NEEDED
Status: DISCONTINUED | OUTPATIENT
Start: 2017-01-01 | End: 2017-01-01 | Stop reason: HOSPADM

## 2017-01-01 RX ORDER — METOPROLOL TARTRATE 100 MG/1
100 TABLET ORAL DAILY
COMMUNITY

## 2017-01-01 RX ORDER — FENTANYL CITRATE 50 UG/ML
INJECTION, SOLUTION INTRAMUSCULAR; INTRAVENOUS
Status: DISPENSED
Start: 2017-01-01 | End: 2017-01-01

## 2017-01-01 RX ORDER — LIDOCAINE HYDROCHLORIDE 20 MG/ML
INJECTION, SOLUTION INFILTRATION; PERINEURAL AS NEEDED
Status: DISCONTINUED | OUTPATIENT
Start: 2017-01-01 | End: 2017-01-01 | Stop reason: SURG

## 2017-01-01 RX ORDER — FENTANYL CITRATE 50 UG/ML
100 INJECTION, SOLUTION INTRAMUSCULAR; INTRAVENOUS
Status: DISCONTINUED | OUTPATIENT
Start: 2017-01-01 | End: 2017-01-01 | Stop reason: HOSPADM

## 2017-01-01 RX ORDER — PROMETHAZINE HYDROCHLORIDE 25 MG/1
12.5 TABLET ORAL ONCE AS NEEDED
Status: DISCONTINUED | OUTPATIENT
Start: 2017-01-01 | End: 2017-01-01 | Stop reason: HOSPADM

## 2017-01-01 RX ORDER — LORAZEPAM 1 MG/1
2 TABLET ORAL
Status: DISCONTINUED | OUTPATIENT
Start: 2017-01-01 | End: 2017-01-01 | Stop reason: HOSPADM

## 2017-01-01 RX ORDER — PANTOPRAZOLE SODIUM 40 MG/10ML
40 INJECTION, POWDER, LYOPHILIZED, FOR SOLUTION INTRAVENOUS
Status: DISCONTINUED | OUTPATIENT
Start: 2017-01-01 | End: 2017-01-01 | Stop reason: ALTCHOICE

## 2017-01-01 RX ORDER — ROCURONIUM BROMIDE 10 MG/ML
INJECTION, SOLUTION INTRAVENOUS AS NEEDED
Status: DISCONTINUED | OUTPATIENT
Start: 2017-01-01 | End: 2017-01-01 | Stop reason: SURG

## 2017-01-01 RX ORDER — PROMETHAZINE HYDROCHLORIDE 25 MG/ML
12.5 INJECTION, SOLUTION INTRAMUSCULAR; INTRAVENOUS EVERY 6 HOURS PRN
Status: DISCONTINUED | OUTPATIENT
Start: 2017-01-01 | End: 2017-01-01

## 2017-01-01 RX ORDER — LORAZEPAM 1 MG/1
1 TABLET ORAL
Status: DISCONTINUED | OUTPATIENT
Start: 2017-01-01 | End: 2017-01-01 | Stop reason: HOSPADM

## 2017-01-01 RX ORDER — LORAZEPAM 0.5 MG/1
0.5 TABLET ORAL
Status: DISCONTINUED | OUTPATIENT
Start: 2017-01-01 | End: 2017-01-01 | Stop reason: HOSPADM

## 2017-01-01 RX ORDER — LABETALOL HYDROCHLORIDE 5 MG/ML
5 INJECTION, SOLUTION INTRAVENOUS
Status: DISCONTINUED | OUTPATIENT
Start: 2017-01-01 | End: 2017-01-01 | Stop reason: HOSPADM

## 2017-01-01 RX ORDER — FAMOTIDINE 10 MG/ML
20 INJECTION, SOLUTION INTRAVENOUS DAILY
Status: DISCONTINUED | OUTPATIENT
Start: 2017-01-01 | End: 2017-01-01

## 2017-01-01 RX ORDER — PROPOFOL 10 MG/ML
VIAL (ML) INTRAVENOUS AS NEEDED
Status: DISCONTINUED | OUTPATIENT
Start: 2017-01-01 | End: 2017-01-01 | Stop reason: SURG

## 2017-01-01 RX ORDER — DEXTROSE MONOHYDRATE 50 MG/ML
50 INJECTION, SOLUTION INTRAVENOUS CONTINUOUS
Status: DISCONTINUED | OUTPATIENT
Start: 2017-01-01 | End: 2017-01-01

## 2017-01-01 RX ORDER — ACETAMINOPHEN 650 MG/1
650 SUPPOSITORY RECTAL EVERY 4 HOURS PRN
Status: DISCONTINUED | OUTPATIENT
Start: 2017-01-01 | End: 2017-01-01 | Stop reason: HOSPADM

## 2017-01-01 RX ORDER — FENTANYL CITRATE 50 UG/ML
50 INJECTION, SOLUTION INTRAMUSCULAR; INTRAVENOUS
Status: CANCELLED | OUTPATIENT
Start: 2017-01-01

## 2017-01-01 RX ORDER — LORAZEPAM 2 MG/ML
1 INJECTION INTRAMUSCULAR EVERY 4 HOURS
Status: DISCONTINUED | OUTPATIENT
Start: 2017-01-01 | End: 2017-01-01 | Stop reason: HOSPADM

## 2017-01-01 RX ORDER — IPRATROPIUM BROMIDE AND ALBUTEROL SULFATE 2.5; .5 MG/3ML; MG/3ML
3 SOLUTION RESPIRATORY (INHALATION)
Status: DISCONTINUED | OUTPATIENT
Start: 2017-01-01 | End: 2017-01-01 | Stop reason: HOSPADM

## 2017-01-01 RX ORDER — VANCOMYCIN HYDROCHLORIDE 1 G/200ML
15 INJECTION, SOLUTION INTRAVENOUS ONCE
Status: COMPLETED | OUTPATIENT
Start: 2017-01-01 | End: 2017-01-01

## 2017-01-01 RX ORDER — POTASSIUM CHLORIDE 29.8 MG/ML
20 INJECTION INTRAVENOUS ONCE
Status: COMPLETED | OUTPATIENT
Start: 2017-01-01 | End: 2017-01-01

## 2017-01-01 RX ORDER — MAGNESIUM HYDROXIDE 1200 MG/15ML
LIQUID ORAL AS NEEDED
Status: DISCONTINUED | OUTPATIENT
Start: 2017-01-01 | End: 2017-01-01 | Stop reason: HOSPADM

## 2017-01-01 RX ORDER — PHENYTOIN SODIUM 100 MG/1
100 CAPSULE, EXTENDED RELEASE ORAL 3 TIMES DAILY
COMMUNITY

## 2017-01-01 RX ORDER — LORAZEPAM 2 MG/ML
0.5 CONCENTRATE ORAL
Status: DISCONTINUED | OUTPATIENT
Start: 2017-01-01 | End: 2017-01-01 | Stop reason: HOSPADM

## 2017-01-01 RX ORDER — FENTANYL CITRATE 50 UG/ML
50 INJECTION, SOLUTION INTRAMUSCULAR; INTRAVENOUS
Status: DISCONTINUED | OUTPATIENT
Start: 2017-01-01 | End: 2017-01-01 | Stop reason: HOSPADM

## 2017-01-01 RX ORDER — DIPHENOXYLATE HYDROCHLORIDE AND ATROPINE SULFATE 2.5; .025 MG/1; MG/1
1 TABLET ORAL
Status: DISCONTINUED | OUTPATIENT
Start: 2017-01-01 | End: 2017-01-01 | Stop reason: HOSPADM

## 2017-01-01 RX ORDER — FAMOTIDINE 10 MG/ML
20 INJECTION, SOLUTION INTRAVENOUS ONCE
Status: CANCELLED | OUTPATIENT
Start: 2017-01-01 | End: 2017-01-01

## 2017-01-01 RX ORDER — ONDANSETRON 2 MG/ML
4 INJECTION INTRAMUSCULAR; INTRAVENOUS ONCE AS NEEDED
Status: DISCONTINUED | OUTPATIENT
Start: 2017-01-01 | End: 2017-01-01 | Stop reason: HOSPADM

## 2017-01-01 RX ORDER — MIDAZOLAM HYDROCHLORIDE 1 MG/ML
1 INJECTION INTRAMUSCULAR; INTRAVENOUS
Status: CANCELLED | OUTPATIENT
Start: 2017-01-01

## 2017-01-01 RX ORDER — ACETAMINOPHEN 160 MG/5ML
650 SOLUTION ORAL EVERY 4 HOURS PRN
Status: DISCONTINUED | OUTPATIENT
Start: 2017-01-01 | End: 2017-01-01 | Stop reason: HOSPADM

## 2017-01-01 RX ORDER — PROMETHAZINE HYDROCHLORIDE 25 MG/ML
12.5 INJECTION, SOLUTION INTRAMUSCULAR; INTRAVENOUS ONCE AS NEEDED
Status: DISCONTINUED | OUTPATIENT
Start: 2017-01-01 | End: 2017-01-01 | Stop reason: HOSPADM

## 2017-01-01 RX ORDER — SODIUM CHLORIDE, SODIUM LACTATE, POTASSIUM CHLORIDE, CALCIUM CHLORIDE 600; 310; 30; 20 MG/100ML; MG/100ML; MG/100ML; MG/100ML
9 INJECTION, SOLUTION INTRAVENOUS CONTINUOUS
Status: CANCELLED | OUTPATIENT
Start: 2017-01-01

## 2017-01-01 RX ORDER — FLUMAZENIL 0.1 MG/ML
0.2 INJECTION INTRAVENOUS AS NEEDED
Status: DISCONTINUED | OUTPATIENT
Start: 2017-01-01 | End: 2017-01-01 | Stop reason: HOSPADM

## 2017-01-01 RX ADMIN — FAMOTIDINE 20 MG: 10 INJECTION, SOLUTION INTRAVENOUS at 08:29

## 2017-01-01 RX ADMIN — TAZOBACTAM SODIUM AND PIPERACILLIN SODIUM 4.5 G: 500; 4 INJECTION, SOLUTION INTRAVENOUS at 16:46

## 2017-01-01 RX ADMIN — FENTANYL CITRATE 100 MCG: 50 INJECTION INTRAMUSCULAR; INTRAVENOUS at 01:03

## 2017-01-01 RX ADMIN — ROCURONIUM BROMIDE 40 MG: 10 INJECTION INTRAVENOUS at 20:31

## 2017-01-01 RX ADMIN — HEPARIN SODIUM 5000 UNITS: 5000 INJECTION, SOLUTION INTRAVENOUS; SUBCUTANEOUS at 14:59

## 2017-01-01 RX ADMIN — ALBUTEROL SULFATE 6 PUFF: 90 AEROSOL, METERED RESPIRATORY (INHALATION) at 11:43

## 2017-01-01 RX ADMIN — GLYCOPYRROLATE 0.2 MG: 0.2 INJECTION, SOLUTION INTRAMUSCULAR; INTRAVENOUS at 21:12

## 2017-01-01 RX ADMIN — ALBUTEROL SULFATE 6 PUFF: 90 AEROSOL, METERED RESPIRATORY (INHALATION) at 23:53

## 2017-01-01 RX ADMIN — FENTANYL CITRATE 50 MCG: 50 INJECTION, SOLUTION INTRAMUSCULAR; INTRAVENOUS at 05:24

## 2017-01-01 RX ADMIN — PANTOPRAZOLE SODIUM 40 MG: 40 INJECTION, POWDER, FOR SOLUTION INTRAVENOUS at 06:08

## 2017-01-01 RX ADMIN — DEXTROSE MONOHYDRATE 50 ML/HR: 50 INJECTION, SOLUTION INTRAVENOUS at 15:34

## 2017-01-01 RX ADMIN — GLYCOPYRROLATE 0.4 MG: 0.2 INJECTION, SOLUTION INTRAMUSCULAR; INTRAVENOUS at 02:50

## 2017-01-01 RX ADMIN — ALBUTEROL SULFATE 6 PUFF: 90 AEROSOL, METERED RESPIRATORY (INHALATION) at 03:39

## 2017-01-01 RX ADMIN — METOROPROLOL TARTRATE 2.5 MG: 5 INJECTION, SOLUTION INTRAVENOUS at 11:17

## 2017-01-01 RX ADMIN — GLYCOPYRROLATE 0.4 MG: 0.2 INJECTION, SOLUTION INTRAMUSCULAR; INTRAVENOUS at 09:02

## 2017-01-01 RX ADMIN — HEPARIN SODIUM 5000 UNITS: 5000 INJECTION, SOLUTION INTRAVENOUS; SUBCUTANEOUS at 22:07

## 2017-01-01 RX ADMIN — FENTANYL CITRATE 25 MCG: 50 INJECTION INTRAMUSCULAR; INTRAVENOUS at 13:49

## 2017-01-01 RX ADMIN — ALBUTEROL SULFATE 6 PUFF: 90 AEROSOL, METERED RESPIRATORY (INHALATION) at 14:12

## 2017-01-01 RX ADMIN — POTASSIUM CHLORIDE 20 MEQ: 400 INJECTION, SOLUTION INTRAVENOUS at 11:13

## 2017-01-01 RX ADMIN — FENTANYL CITRATE 50 MCG: 50 INJECTION INTRAMUSCULAR; INTRAVENOUS at 20:12

## 2017-01-01 RX ADMIN — FENTANYL CITRATE 25 MCG: 50 INJECTION INTRAMUSCULAR; INTRAVENOUS at 12:42

## 2017-01-01 RX ADMIN — SENNOSIDES AND DOCUSATE SODIUM 2 TABLET: 8.6; 5 TABLET ORAL at 22:06

## 2017-01-01 RX ADMIN — ALBUTEROL SULFATE 6 PUFF: 90 AEROSOL, METERED RESPIRATORY (INHALATION) at 23:38

## 2017-01-01 RX ADMIN — MIDAZOLAM HYDROCHLORIDE 0.5 MG: 5 INJECTION, SOLUTION INTRAMUSCULAR; INTRAVENOUS at 20:12

## 2017-01-01 RX ADMIN — GLYCOPYRROLATE 0.4 MG: 0.2 INJECTION, SOLUTION INTRAMUSCULAR; INTRAVENOUS at 05:29

## 2017-01-01 RX ADMIN — FENTANYL CITRATE 100 MCG: 50 INJECTION INTRAMUSCULAR; INTRAVENOUS at 17:07

## 2017-01-01 RX ADMIN — LIDOCAINE HYDROCHLORIDE 10 ML: 10 INJECTION, SOLUTION INFILTRATION; PERINEURAL at 17:25

## 2017-01-01 RX ADMIN — FENTANYL CITRATE 25 MCG: 50 INJECTION INTRAMUSCULAR; INTRAVENOUS at 21:13

## 2017-01-01 RX ADMIN — SCOPALAMINE 1 PATCH: 1 PATCH, EXTENDED RELEASE TRANSDERMAL at 18:23

## 2017-01-01 RX ADMIN — FENTANYL CITRATE 100 MCG: 50 INJECTION INTRAMUSCULAR; INTRAVENOUS at 13:42

## 2017-01-01 RX ADMIN — FAMOTIDINE 20 MG: 10 INJECTION, SOLUTION INTRAVENOUS at 20:12

## 2017-01-01 RX ADMIN — TAZOBACTAM SODIUM AND PIPERACILLIN SODIUM 4.5 G: 500; 4 INJECTION, SOLUTION INTRAVENOUS at 07:05

## 2017-01-01 RX ADMIN — LORAZEPAM 2 MG: 2 INJECTION INTRAMUSCULAR; INTRAVENOUS at 20:19

## 2017-01-01 RX ADMIN — FENTANYL CITRATE 100 MCG: 50 INJECTION INTRAMUSCULAR; INTRAVENOUS at 21:36

## 2017-01-01 RX ADMIN — PROPOFOL 100 MG: 10 INJECTION, EMULSION INTRAVENOUS at 20:31

## 2017-01-01 RX ADMIN — FENTANYL CITRATE 25 MCG: 50 INJECTION INTRAMUSCULAR; INTRAVENOUS at 20:19

## 2017-01-01 RX ADMIN — FENTANYL CITRATE 100 MCG: 50 INJECTION INTRAMUSCULAR; INTRAVENOUS at 09:32

## 2017-01-01 RX ADMIN — HEPARIN SODIUM 5000 UNITS: 5000 INJECTION, SOLUTION INTRAVENOUS; SUBCUTANEOUS at 06:08

## 2017-01-01 RX ADMIN — ALBUTEROL SULFATE 6 PUFF: 90 AEROSOL, METERED RESPIRATORY (INHALATION) at 12:21

## 2017-01-01 RX ADMIN — ALBUTEROL SULFATE 6 PUFF: 90 AEROSOL, METERED RESPIRATORY (INHALATION) at 11:13

## 2017-01-01 RX ADMIN — FENTANYL CITRATE 25 MCG: 50 INJECTION INTRAMUSCULAR; INTRAVENOUS at 03:56

## 2017-01-01 RX ADMIN — TAZOBACTAM SODIUM AND PIPERACILLIN SODIUM 4.5 G: 500; 4 INJECTION, SOLUTION INTRAVENOUS at 05:05

## 2017-01-01 RX ADMIN — ALBUTEROL SULFATE 6 PUFF: 90 AEROSOL, METERED RESPIRATORY (INHALATION) at 07:22

## 2017-01-01 RX ADMIN — ALBUTEROL SULFATE 6 PUFF: 90 AEROSOL, METERED RESPIRATORY (INHALATION) at 08:13

## 2017-01-01 RX ADMIN — VANCOMYCIN HYDROCHLORIDE 1000 MG: 1 INJECTION, SOLUTION INTRAVENOUS at 17:05

## 2017-01-01 RX ADMIN — LORAZEPAM 2 MG: 2 INJECTION INTRAMUSCULAR; INTRAVENOUS at 09:02

## 2017-01-01 RX ADMIN — ALBUTEROL SULFATE 6 PUFF: 90 AEROSOL, METERED RESPIRATORY (INHALATION) at 15:10

## 2017-01-01 RX ADMIN — ALBUTEROL SULFATE 6 PUFF: 90 AEROSOL, METERED RESPIRATORY (INHALATION) at 19:32

## 2017-01-01 RX ADMIN — FENTANYL CITRATE 100 MCG: 50 INJECTION INTRAMUSCULAR; INTRAVENOUS at 20:18

## 2017-01-01 RX ADMIN — ALBUTEROL SULFATE 6 PUFF: 90 AEROSOL, METERED RESPIRATORY (INHALATION) at 07:02

## 2017-01-01 RX ADMIN — ALBUTEROL SULFATE 6 PUFF: 90 AEROSOL, METERED RESPIRATORY (INHALATION) at 23:44

## 2017-01-01 RX ADMIN — HEPARIN SODIUM 5000 UNITS: 5000 INJECTION, SOLUTION INTRAVENOUS; SUBCUTANEOUS at 14:52

## 2017-01-01 RX ADMIN — LORAZEPAM 2 MG: 2 INJECTION INTRAMUSCULAR; INTRAVENOUS at 01:03

## 2017-01-01 RX ADMIN — ALBUTEROL SULFATE 6 PUFF: 90 AEROSOL, METERED RESPIRATORY (INHALATION) at 04:03

## 2017-01-01 RX ADMIN — FENTANYL CITRATE 50 MCG: 50 INJECTION INTRAMUSCULAR; INTRAVENOUS at 19:27

## 2017-01-01 RX ADMIN — POTASSIUM CHLORIDE 20 MEQ: 400 INJECTION, SOLUTION INTRAVENOUS at 09:12

## 2017-01-01 RX ADMIN — TAZOBACTAM SODIUM AND PIPERACILLIN SODIUM 4.5 G: 500; 4 INJECTION, SOLUTION INTRAVENOUS at 06:08

## 2017-01-01 RX ADMIN — METOROPROLOL TARTRATE 2.5 MG: 5 INJECTION, SOLUTION INTRAVENOUS at 15:04

## 2017-01-01 RX ADMIN — SODIUM CHLORIDE 100 ML/HR: 9 INJECTION, SOLUTION INTRAVENOUS at 04:23

## 2017-01-01 RX ADMIN — PANTOPRAZOLE SODIUM 40 MG: 40 INJECTION, POWDER, FOR SOLUTION INTRAVENOUS at 11:13

## 2017-01-01 RX ADMIN — HEPARIN SODIUM 5000 UNITS: 5000 INJECTION, SOLUTION INTRAVENOUS; SUBCUTANEOUS at 21:39

## 2017-01-01 RX ADMIN — FAMOTIDINE 20 MG: 10 INJECTION, SOLUTION INTRAVENOUS at 09:17

## 2017-01-01 RX ADMIN — FENTANYL CITRATE 100 MCG: 50 INJECTION INTRAMUSCULAR; INTRAVENOUS at 06:27

## 2017-01-01 RX ADMIN — LORAZEPAM 2 MG: 2 SOLUTION, CONCENTRATE ORAL at 13:42

## 2017-01-01 RX ADMIN — FENTANYL CITRATE 100 MCG: 50 INJECTION INTRAMUSCULAR; INTRAVENOUS at 17:30

## 2017-01-01 RX ADMIN — SODIUM CHLORIDE 100 ML/HR: 9 INJECTION, SOLUTION INTRAVENOUS at 10:02

## 2017-01-01 RX ADMIN — SODIUM CHLORIDE 1905 ML: 9 INJECTION, SOLUTION INTRAVENOUS at 16:41

## 2017-01-01 RX ADMIN — ALBUTEROL SULFATE 6 PUFF: 90 AEROSOL, METERED RESPIRATORY (INHALATION) at 14:39

## 2017-01-01 RX ADMIN — FENTANYL CITRATE 50 MCG: 50 INJECTION INTRAMUSCULAR; INTRAVENOUS at 16:12

## 2017-01-01 RX ADMIN — LORAZEPAM 2 MG: 2 INJECTION INTRAMUSCULAR; INTRAVENOUS at 00:39

## 2017-01-01 RX ADMIN — TAZOBACTAM SODIUM AND PIPERACILLIN SODIUM 4.5 G: 500; 4 INJECTION, SOLUTION INTRAVENOUS at 17:53

## 2017-01-01 RX ADMIN — FENTANYL CITRATE 100 MCG: 50 INJECTION INTRAMUSCULAR; INTRAVENOUS at 02:50

## 2017-01-01 RX ADMIN — LORAZEPAM 2 MG: 2 SOLUTION, CONCENTRATE ORAL at 17:07

## 2017-01-01 RX ADMIN — GLYCOPYRROLATE 0.4 MG: 0.2 INJECTION, SOLUTION INTRAMUSCULAR; INTRAVENOUS at 09:32

## 2017-01-01 RX ADMIN — FENTANYL CITRATE 25 MCG: 50 INJECTION INTRAMUSCULAR; INTRAVENOUS at 16:49

## 2017-01-01 RX ADMIN — ALBUTEROL SULFATE 6 PUFF: 90 AEROSOL, METERED RESPIRATORY (INHALATION) at 03:47

## 2017-01-01 RX ADMIN — PHENYLEPHRINE HYDROCHLORIDE 100 MCG: 10 INJECTION INTRAVENOUS at 20:31

## 2017-01-01 RX ADMIN — VANCOMYCIN HYDROCHLORIDE 1000 MG: 1 INJECTION, SOLUTION INTRAVENOUS at 15:39

## 2017-01-01 RX ADMIN — FENTANYL CITRATE 100 MCG: 50 INJECTION INTRAMUSCULAR; INTRAVENOUS at 21:30

## 2017-01-01 RX ADMIN — NOREPINEPHRINE BITARTRATE 0.02 MCG/KG/MIN: 8 SOLUTION at 04:36

## 2017-01-01 RX ADMIN — LIDOCAINE HYDROCHLORIDE 2 ML: 10 INJECTION, SOLUTION INFILTRATION; PERINEURAL at 17:12

## 2017-01-01 RX ADMIN — ALBUTEROL SULFATE 6 PUFF: 90 AEROSOL, METERED RESPIRATORY (INHALATION) at 16:26

## 2017-01-01 RX ADMIN — SENNOSIDES AND DOCUSATE SODIUM 2 TABLET: 8.6; 5 TABLET ORAL at 21:13

## 2017-01-01 RX ADMIN — FENTANYL CITRATE 50 MCG: 50 INJECTION INTRAMUSCULAR; INTRAVENOUS at 23:39

## 2017-01-01 RX ADMIN — VANCOMYCIN HYDROCHLORIDE 1250 MG: 10 INJECTION, POWDER, LYOPHILIZED, FOR SOLUTION INTRAVENOUS at 14:59

## 2017-01-01 RX ADMIN — TAZOBACTAM SODIUM AND PIPERACILLIN SODIUM 4.5 G: 500; 4 INJECTION, SOLUTION INTRAVENOUS at 17:41

## 2017-01-01 RX ADMIN — LORAZEPAM 0.5 MG: 2 INJECTION, SOLUTION INTRAMUSCULAR; INTRAVENOUS at 17:31

## 2017-01-01 RX ADMIN — SENNOSIDES AND DOCUSATE SODIUM 2 TABLET: 8.6; 5 TABLET ORAL at 20:04

## 2017-01-01 RX ADMIN — GLYCOPYRROLATE 0.4 MG: 0.2 INJECTION, SOLUTION INTRAMUSCULAR; INTRAVENOUS at 16:47

## 2017-01-01 RX ADMIN — ALBUTEROL SULFATE 6 PUFF: 90 AEROSOL, METERED RESPIRATORY (INHALATION) at 11:52

## 2017-01-01 RX ADMIN — VANCOMYCIN HYDROCHLORIDE 500 MG: 500 INJECTION, POWDER, LYOPHILIZED, FOR SOLUTION INTRAVENOUS at 18:00

## 2017-01-01 RX ADMIN — FENTANYL CITRATE 25 MCG: 50 INJECTION INTRAMUSCULAR; INTRAVENOUS at 15:16

## 2017-01-01 RX ADMIN — METOROPROLOL TARTRATE 2.5 MG: 5 INJECTION, SOLUTION INTRAVENOUS at 21:13

## 2017-01-01 RX ADMIN — TAZOBACTAM SODIUM AND PIPERACILLIN SODIUM 4.5 G: 500; 4 INJECTION, SOLUTION INTRAVENOUS at 17:05

## 2017-01-01 RX ADMIN — GLYCOPYRROLATE 0.4 MG: 0.2 INJECTION, SOLUTION INTRAMUSCULAR; INTRAVENOUS at 13:45

## 2017-01-01 RX ADMIN — LORAZEPAM 1 MG: 2 INJECTION, SOLUTION INTRAMUSCULAR; INTRAVENOUS at 18:23

## 2017-01-01 RX ADMIN — LORAZEPAM 1 MG: 2 INJECTION, SOLUTION INTRAMUSCULAR; INTRAVENOUS at 09:33

## 2017-01-01 RX ADMIN — SODIUM CHLORIDE 100 ML/HR: 9 INJECTION, SOLUTION INTRAVENOUS at 08:23

## 2017-01-01 RX ADMIN — GLYCOPYRROLATE 0.4 MG: 0.2 INJECTION, SOLUTION INTRAMUSCULAR; INTRAVENOUS at 01:03

## 2017-01-01 RX ADMIN — PHENYLEPHRINE HYDROCHLORIDE 10000 MCG: 10 INJECTION INTRAVENOUS at 00:05

## 2017-01-01 RX ADMIN — TAZOBACTAM SODIUM AND PIPERACILLIN SODIUM 3.38 G: 375; 3 INJECTION, SOLUTION INTRAVENOUS at 05:30

## 2017-01-01 RX ADMIN — FENTANYL CITRATE 100 MCG: 50 INJECTION INTRAMUSCULAR; INTRAVENOUS at 13:28

## 2017-01-01 RX ADMIN — ALBUTEROL SULFATE 6 PUFF: 90 AEROSOL, METERED RESPIRATORY (INHALATION) at 19:38

## 2017-01-01 RX ADMIN — FENTANYL CITRATE 100 MCG: 50 INJECTION INTRAMUSCULAR; INTRAVENOUS at 16:47

## 2017-01-01 RX ADMIN — LANSOPRAZOLE 30 MG: KIT at 07:05

## 2017-01-01 RX ADMIN — FENTANYL CITRATE 100 MCG: 50 INJECTION INTRAMUSCULAR; INTRAVENOUS at 09:02

## 2017-01-01 RX ADMIN — HEPARIN SODIUM 5000 UNITS: 5000 INJECTION, SOLUTION INTRAVENOUS; SUBCUTANEOUS at 05:04

## 2017-01-01 RX ADMIN — LIDOCAINE HYDROCHLORIDE 40 MG: 20 INJECTION, SOLUTION INFILTRATION; PERINEURAL at 20:31

## 2017-01-01 RX ADMIN — HEPARIN SODIUM 5000 UNITS: 5000 INJECTION, SOLUTION INTRAVENOUS; SUBCUTANEOUS at 17:13

## 2017-01-01 RX ADMIN — ALBUTEROL SULFATE 6 PUFF: 90 AEROSOL, METERED RESPIRATORY (INHALATION) at 19:50

## 2017-01-01 RX ADMIN — GLYCOPYRROLATE 0.2 MG: 0.2 INJECTION, SOLUTION INTRAMUSCULAR; INTRAVENOUS at 13:29

## 2017-01-01 RX ADMIN — PROPOFOL 15 MCG/KG/MIN: 10 INJECTION, EMULSION INTRAVENOUS at 10:01

## 2017-01-01 RX ADMIN — GLYCOPYRROLATE 0.4 MG: 0.2 INJECTION, SOLUTION INTRAMUSCULAR; INTRAVENOUS at 17:07

## 2017-01-01 RX ADMIN — FENTANYL CITRATE 100 MCG: 50 INJECTION INTRAMUSCULAR; INTRAVENOUS at 07:39

## 2017-01-01 RX ADMIN — LORAZEPAM 1 MG: 2 SOLUTION, CONCENTRATE ORAL at 13:29

## 2017-01-01 RX ADMIN — FENTANYL CITRATE 25 MCG: 50 INJECTION INTRAMUSCULAR; INTRAVENOUS at 07:05

## 2017-10-30 PROBLEM — K43.6 INCARCERATED VENTRAL HERNIA: Status: ACTIVE | Noted: 2017-01-01

## 2017-10-30 PROBLEM — J96.21 ACUTE ON CHRONIC RESPIRATORY FAILURE WITH HYPOXIA (HCC): Status: ACTIVE | Noted: 2017-01-01

## 2017-10-30 PROBLEM — J96.01 ACUTE RESPIRATORY FAILURE WITH HYPOXIA (HCC): Status: ACTIVE | Noted: 2017-01-01

## 2017-10-31 NOTE — ANESTHESIA PROCEDURE NOTES
Airway  Urgency: elective    Airway not difficult    General Information and Staff    Patient location during procedure: OR  Anesthesiologist: ADAN BALDWIN    Indications and Patient Condition  Indications for airway management: airway protection    Preoxygenated: yes  Mask difficulty assessment: 1 - vent by mask    Final Airway Details  Final airway type: endotracheal airway      Successful airway: ETT  Cuffed: yes   Successful intubation technique: direct laryngoscopy  Endotracheal tube insertion site: oral  Blade: Ravi  Blade size: #3  ETT size: 7.5 mm  Cormack-Lehane Classification: grade I - full view of glottis  Placement verified by: chest auscultation and capnometry   Number of attempts at approach: 1

## 2017-10-31 NOTE — ANESTHESIA PROCEDURE NOTES
Arterial Line    Patient location during procedure: holding area   Line placed for hemodynamic monitoring.  Performed By   Anesthesiologist: STEWART ENGLE  Preanesthetic Checklist  Completed: patient identified, site marked, pre-op evaluation, IV checked, risks and benefits discussed and monitors and equipment checked  Arterial Line Prep   Prep: ChloraPrep  Arterial Line Procedure   Laterality:left  Location:  radial artery  Catheter size: 20 G   Guidance: ultrasound guided  PROCEDURE NOTE/ULTRASOUND INTERPRETATION.  Using ultrasound guidance the potential vascular sites for insertion of the catheter were visualized to determine the patency of the vessel to be used for vascular access.  After selecting the appropriate site for insertion, the needle was visualized under ultrasound being inserted into the radial artery, followed by ultrasound confirmation of wire and catheter placement. There were no abnormalities seen on ultrasound; an image was taken; and the patient tolerated the procedure with no complications.   Successful placement: yes          Post Assessment   Dressing Type: occlusive dressing applied.   Complications no  Circ/Move/Sens Assessment: normal.   Patient Tolerance: patient tolerated the procedure well with no apparent complications

## 2017-10-31 NOTE — ANESTHESIA POSTPROCEDURE EVALUATION
Patient: Kamryn Bradford    Procedure Summary     Date Anesthesia Start Anesthesia Stop Room / Location    10/30/17 2028 2224  [facility] /  [facility]       Procedure Diagnosis Surgeon Provider    LAPAROTOMY EXPLORATORY WITH SMALL BOWEL RESECTION (N/A Abdomen) Small bowel perforation  (Incarcerated ventral hernia [K46.0]) MD Ginna Zendejas Jr., MD          Anesthesia Type: general  Last vitals  BP   (!) 82/57 (10/31/17 0000)   Temp   36.7 °C (98.1 °F) (10/30/17 2211)   Pulse   73 (10/31/17 0000)   Resp   12 (10/31/17 0000)     SpO2   93 % (10/31/17 0000)     Post Anesthesia Care and Evaluation    Patient location during evaluation: PACU  Anesthetic complications: No anesthetic complications

## 2017-10-31 NOTE — ANESTHESIA PREPROCEDURE EVALUATION
Anesthesia Evaluation     history of anesthetic complications:         Airway   Mallampati: II  TM distance: >3 FB  Neck ROM: full  no difficulty expected  Dental      Comment: No upper teeth    Pulmonary    (+) a smoker Current, COPD, rhonchi,     ROS comment: Hypoxic in emergency room  Placed on BIPAP  PaO2 50 %  Cardiovascular - normal exam    Patient on routine beta blocker    (+) hypertension,     ROS comment: Noncompliant with meds including beta blockers    Neuro/Psych  (+) seizures, CVA residual symptoms,    GI/Hepatic/Renal/Endo      Musculoskeletal     Abdominal    Substance History      OB/GYN          Other                                        Anesthesia Plan    ASA 4 - emergent     general     intravenous induction   Anesthetic plan and risks discussed with child.

## 2017-11-01 NOTE — PROGRESS NOTES
Continued Stay Note  [facility]     Patient Name: Kamryn Bradford  MRN: [id number]  Today's Date: 11/1/2017    Admit Date: 10/30/2017          Discharge Plan       11/01/17 1623    Case Management/Social Work Plan    Plan Attempted screen.  Unable to reach daughter by phone.  CCP will follow.              Discharge Codes     None            Sunitha Cowan RN

## 2017-11-01 NOTE — PLAN OF CARE
Problem: Patient Care Overview (Adult)  Goal: Plan of Care Review  Outcome: Ongoing (interventions implemented as appropriate)  The patient has done well through the shift. There has been virtually no drainage from the incision site through the shift. Her morning labs reveal a slightly increased creatinine from yesterday, though a slightly decreased potassium. She has had little output from the najera catheter through the shift. Her arterial line continued to reveal a dampened waveform and inaccurate pressures so it was removed, we were able to support her BP without the need of levophed through a good portion of the shift. She remains relatively unresponsive, despite being on no sedation. She withdraws from pain and she will grimace and bite when being suctioned, but will not follow commands or track with her eyes.        11/01/17 0451   Coping/Psychosocial Response Interventions   Plan Of Care Reviewed With patient   Patient Care Overview   Progress progress toward functional goals as expected         Problem: SAFETY - NON-VIOLENT RESTRAINT  Goal: Remains free of injury from restraints (Non-Violent Restraint)  Outcome: Ongoing (interventions implemented as appropriate)  Goal: Free from restraint(s) (Non-Violent Restraint)  Outcome: Ongoing (interventions implemented as appropriate)

## 2017-11-01 NOTE — THERAPY TREATMENT NOTE
Acute Care - Physical Therapy Treatment Note  [facility]     Patient Name: Kamryn Bradford  : 1934  MRN: [id number]  Today's Date: 2017  Onset of Illness/Injury or Date of Surgery Date: 10/30/17  Date of Referral to PT: 10/30/17  Referring Physician: Lesvia    Admit Date: 10/30/2017    Visit Dx:    ICD-10-CM ICD-9-CM   1. Acute respiratory failure with hypoxia J96.01 518.81   2. Acute renal failure, unspecified acute renal failure type N17.9 584.9   3. Incarcerated ventral hernia K46.0 552.20   4. Sepsis, due to unspecified organism A41.9 038.9     995.91   5. Generalized weakness R53.1 780.79     Patient Active Problem List   Diagnosis   • Acute on chronic respiratory failure with hypoxia   • Incarcerated ventral hernia   • Acute respiratory failure with hypoxia               Adult Rehabilitation Note       17 1349          Rehab Assessment/Intervention    Discipline physical therapist  -EM      Document Type therapy note (daily note)  -EM      Subjective Information unresponsive  -EM      Recorded by [EM] Jenna Song PT      Vital Signs    Pre Systolic BP Rehab 118  -EM      Pre Treatment Diastolic BP 74  -EM      Pretreatment Heart Rate (beats/min) 104  -EM      Posttreatment Heart Rate (beats/min) 98  -EM      Pre SpO2 (%) 97  -EM      O2 Delivery Pre Treatment supplemental O2   on vent   -EM      Recorded by [EM] Jenna Song PT      Pain Assessment    Pain Assessment Unable to assess  -EM      Recorded by [EM] Jenna Song PT      Therapy Exercises    Bilateral Lower Extremities PROM:;10 reps  -EM      Bilateral Upper Extremity PROM:;10 reps   passive cervical rotation x5   -EM      Recorded by [EM] Jenna Song PT      Positioning and Restraints    Pre-Treatment Position in bed  -EM      Post Treatment Position bed  -EM      In Bed supine;with family/caregiver  -EM      Recorded by [EM] Jenna Song PT        User Key  (r) = Recorded By, (t) = Taken  By, (c) = Cosigned By    Initials Name Effective Dates    ADRYAN Song, PT 12/01/15 -                 IP PT Goals       10/31/17 1455          Bed Mobility PT LTG    Bed Mobility PT LTG, Date Established 10/31/17  -PC (r) MF (t) PC (c)      Bed Mobility PT LTG, Time to Achieve 1 wk  -PC (r) MF (t) PC (c)      Bed Mobility PT LTG, Activity Type all bed mobility  -PC (r) MF (t) PC (c)      Bed Mobility PT LTG, Phelps Level minimum assist (75% patient effort)  -PC (r) MF (t) PC (c)      Bed Mobility PT Goal  LTG, Assist Device bed rails  -PC (r) MF (t) PC (c)      Transfer Training 2 PT LTG    Transfer Training PT 2 LTG, Time to Achieve 1 wk  -PC (r) MF (t) PC (c)      Transfer Training PT 2 LTG, Activity Type sit to stand/stand to sit  -PC (r) MF (t) PC (c)      Transfer Training PT 2 LTG, Phelps Level 2 person assist required;moderate assist (50% patient effort)  -PC (r) MF (t) PC (c)      Static Sitting Balance PT LTG    Static Sitting Balance PT LTG, Date Established 10/31/17  -PC (r) MF (t) PC (c)      Static Sitting Balance PT LTG, Time to Achieve 1 wk  -PC (r) MF (t) PC (c)      Static Sitting Balance PT LTG, Phelps Level minimum assist (75% patient effort)  -PC (r) MF (t) PC (c)      Static Sitting Balance PT LTG, Assist Device UE Support  -PC (r) MF (t) PC (c)      Static Sitting Balance PT LTG, Additional Goal 4 minutes sitting EOB   -PC (r) MF (t) PC (c)        User Key  (r) = Recorded By, (t) = Taken By, (c) = Cosigned By    Initials Name Provider Type    THOM Kirkland, PT Physical Therapist     Nehemias Ovalles, PT Student PT Student          Physical Therapy Education     Title: PT OT SLP Therapies (Active)     Topic: Physical Therapy (Active)     Point: Mobility training (Active)    Learning Progress Summary    Learner Readiness Method Response Comment Documented by Status   Patient Nonacceptance E,D NL,NR   10/31/17 2989 Active               Point: Home exercise program  (Done)    Learning Progress Summary    Learner Readiness Method Response Comment Documented by Status   Patient Acceptance E VU family verbalized understanding of ROM EM 11/01/17 1352 Done    Nonacceptance E,D NL,NR   10/31/17 1455 Active   Family Acceptance E VU family verbalized understanding of ROM EM 11/01/17 1352 Done               Point: Body mechanics (Active)    Learning Progress Summary    Learner Readiness Method Response Comment Documented by Status   Patient Nonacceptance E,D NL,NR   10/31/17 1455 Active               Point: Precautions (Active)    Learning Progress Summary    Learner Readiness Method Response Comment Documented by Status   Patient Nonacceptance E,D NL,NR   10/31/17 1455 Active                      User Key     Initials Effective Dates Name Provider Type Discipline     12/01/15 -  Jenna Song, PT Physical Therapist PT     09/18/17 -  Nehemias Ovalles, PT Student PT Student PT                    PT Recommendation and Plan  Anticipated Discharge Disposition: skilled nursing facility (difficult to assess at this time, pt sedated on vent )  Planned Therapy Interventions: balance training, bed mobility training, home exercise program, gait training, strengthening, transfer training  PT Frequency: 3 times/wk  Plan of Care Review  Plan Of Care Reviewed With: patient  Progress: unable to show any progress toward functional goals  Outcome Summary/Follow up Plan: patient currently on vent, unable to arouse patient to actively participate in therapy. slight spontaneous movement of RUE noted but not to command. family at bedside, will decrease frequency of PT for now, medical testing ongoing           Outcome Measures       11/01/17 1300 10/31/17 1500       How much help from another person do you currently need...    Turning from your back to your side while in flat bed without using bedrails? 1  -EM 2  -PC (r) MF (t) PC (c)     Moving from lying on back to sitting on the side of a  flat bed without bedrails? 1  -EM 2  -PC (r) MF (t) PC (c)     Moving to and from a bed to a chair (including a wheelchair)? 1  -EM 1  -PC (r) MF (t) PC (c)     Standing up from a chair using your arms (e.g., wheelchair, bedside chair)? 1  -EM 1  -PC (r) MF (t) PC (c)     Climbing 3-5 steps with a railing? 1  -EM 1  -PC (r) MF (t) PC (c)     To walk in hospital room? 1  -EM 1  -PC (r) MF (t) PC (c)     AM-PAC 6 Clicks Score 6  -EM 8  -PC (r) MF (t)     Functional Assessment    Outcome Measure Options  AM-PAC 6 Clicks Basic Mobility (PT)  -PC (r) MF (t) PC (c)       User Key  (r) = Recorded By, (t) = Taken By, (c) = Cosigned By    Initials Name Provider Type    PC Jaye Kirkland, PT Physical Therapist    EM Jenna Song, PT Physical Therapist     Nehemias Ovalles, PT Student PT Student           Time Calculation:         PT Charges       11/01/17 1356          Time Calculation    Start Time 1330  -EM      Stop Time 1345  -EM      Time Calculation (min) 15 min  -EM      PT Received On 11/01/17  -EM      PT - Next Appointment 11/03/17  -EM        User Key  (r) = Recorded By, (t) = Taken By, (c) = Cosigned By    Initials Name Provider Type     Jenna Song, PT Physical Therapist          Therapy Charges for Today     Code Description Service Date Service Provider Modifiers Qty    69835472656 HC PT THER PROC EA 15 MIN 11/1/2017 Jenna Song, PT GP 1          PT G-Codes  Outcome Measure Options: AM-PAC 6 Clicks Basic Mobility (PT)    Jenna Song, PT  11/1/2017

## 2017-11-01 NOTE — PROGRESS NOTES
"   LOS: 2 days   Patient Care Team:  Nehemias Bradford MD as PCP - General (Family Medicine)    Chief Complaint: S/P Exploratory laparotomy with small bowel resection    Subjective     History of Present Illness    Subjective    The patient remainS on the ventilator.    Objective     Vital Signs  Temp:  [97.6 °F (36.4 °C)-99.1 °F (37.3 °C)] 97.6 °F (36.4 °C)  Heart Rate:  [] 101  Resp:  [25-27] 27  BP: ()/() 125/76  Arterial Line BP: ()/(57-82) 86/80  FiO2 (%):  [40 %] 40 %    Objective:  Vital signs: (most recent): Blood pressure 125/76, pulse 101, temperature 97.6 °F (36.4 °C), temperature source Oral, resp. rate 27, height 64\" (162.6 cm), weight 145 lb 15.1 oz (66.2 kg), SpO2 94 %.    Abdomen: Abdomen is soft and non-distended.  Bowel sounds are normal.   There is no abdominal tenderness.  (No apparent tenderness).   (Wound: Clean with bed of granulation tissue).             Results Review:     I reviewed the patient's new clinical results.    Medication Review: Reviewed.    Assessment/Plan     Principal Problem:    Acute on chronic respiratory failure with hypoxia  Active Problems:    Incarcerated ventral hernia    Acute respiratory failure with hypoxia      Assessment & Plan     The patient is stable after the exploratory laparotomy with small bowel resection.    The NG tube output remains bilious.    Continue bowel rest and await return of bowel function.    Zuhair Wade Jr., MD  11/01/17  7:57 AM          "

## 2017-11-01 NOTE — PROGRESS NOTES
[facility]  Phone: [phone]  Shay Ledesma MD    Subjective:  LOS: 2    Off sedation this morning and only poorly responsive, see below.    Objective   Vital Signs past 24hrs  BP range: BP: ()/() 142/86  Pulse range: Heart Rate:  [] 106  Resp rate range: Resp:  [25-29] 29  Temp range: Temp (24hrs), Av.2 °F (36.8 °C), Min:97.6 °F (36.4 °C), Max:99.1 °F (37.3 °C)    O2 Device: mechanical ventilator   Oxygen range:SpO2:  [81 %-97 %] 95 %   145 lb 15.1 oz (66.2 kg); Body mass index is 25.05 kg/(m^2).    Intake/Output Summary (Last 24 hours) at 17 1310  Last data filed at 17 1200   Gross per 24 hour   Intake             3368 ml   Output              600 ml   Net             2768 ml       Physical Exam   Constitutional: She appears well-developed. She appears lethargic. She is intubated and restrained.   Eyes: Conjunctivae are normal.   Neck: Normal range of motion. Neck supple.   Cardiovascular: Normal rate and regular rhythm.    No murmur heard.  Pulmonary/Chest: Effort normal. She is intubated. No respiratory distress. She has no wheezes. She has no rales.   Abdominal: Soft. She exhibits no mass. Bowel sounds are absent. Tenderness: unable to assess.   Surgical wound   Musculoskeletal: She exhibits no edema.   Neurological: She appears lethargic.   RUE withdraws briskly to pain  Does not withdraw other extremities  Does not spontaneously eyes but winces   Skin: Skin is warm. No rash noted.     Results Review:    I have reviewed the laboratory and imaging data since the last note by [facility] physician.  My annotations are noted in assessment and plan.    Medication Review:  I have reviewed the current MAR.  My annotations are noted in assessment and plan.      norepinephrine 0.02-0.3 mcg/kg/min Last Rate: Stopped (17 0022)   Pharmacy to dose vancomycin     Pharmacy to Dose Zosyn     phenylephrine 0.5-3 mcg/kg/min Last Rate: Stopped (10/31/17 1335)   propofol 5-50  mcg/kg/min Last Rate: Stopped (10/31/17 3108)   sodium chloride 100 mL/hr Last Rate: 100 mL/hr (11/01/17 4951)     Plan   PCCM Problems  Acute respiratory failure, vent 10/30  Septic  shock  Bowel perf with incarc hernia, op 10/30  MAURY  Elevated troponin  Elevated LFT's  Relevant Medical Diagnoses  ? CKD  Hx CVA  Sz disorder  Cigarette smoker    Plan of Treatment  She was placed on pressure support motivated.  However she is struggling a little bit.  I therefore returned her to assist controlled mode. Check ABG.    Possible neurological changes therefore sent for a stat CT head.  This was unremarkable per my personal discussion with the neuroradiologist.  She has a history of seizures.  She has not been on phenytoin for the past 2 years.  I will check an EEG.    She remains on low-dose Levophed.  Manage fluids as per renal service.    Her bowels remain silent.  She is on antibiotics.  Her urine cultures show gram-negatives.  A blood culture was coagulase-negative staph.  No cultures from the abdomen.  Continue back for now.  Possibly dc in a day or so she is clearly improving and shock resolved.    Suspect troponin and lft's related to shock. Better. ECHO unremarkable.    I spent +35 mins critical care time in care of this patient outside of any procedures.     Shay Ledesma MD  11/01/17  1:10 PM    Part of this note may be an electronic transcription/translation of spoken language to printed text using the Dragon Dictation System.

## 2017-11-01 NOTE — PROGRESS NOTES
"Pharmacokinetic Evaluation - Vancomycin and Zosyn    Kamryn Bradford is a 82 y.o. female on vancomycin and Zosyn pharmacy to dose.  MRN: [id number]  : 1934    Day of vancomycin therapy: 2  Day of Zosyn therapy: 3  Indication: sepsis  Consulted by: Dr. Ledesma/ Dr. LUIS Lakhani  Goal trough: 15-20 mcg/ml    Current vancomycin dose: intermittent  Current Zosyn dose 4.5gm IV q12h    Blood pressure 142/86, pulse 106, temperature 98.1 °F (36.7 °C), temperature source Oral, resp. rate (!) 29, height 64\" (162.6 cm), weight 145 lb 15.1 oz (66.2 kg), SpO2 95 %.    Results from last 7 days  Lab Units 11/01/17  0310 10/31/17  0417 10/30/17  1534   CREATININE mg/dL 2.77* 2.40* 2.55*     Estimated Creatinine Clearance: 14.7 mL/min (by C-G formula based on Cr of 2.77).    Results from last 7 days  Lab Units 11/01/17  0310 10/31/17  0417 10/30/17  2135 10/30/17  1458   WBC 10*3/mm3 11.95* 9.54  --  13.33*   HEMOGLOBIN g/dL 14.9 16.9*  --  19.6*   HEMOGLOBIN, POC g/dL  --   --  15.3  --    HEMATOCRIT % 45.5 51.1*  --  57.4*   HEMATOCRIT POC %  --   --  45  --    PLATELETS 10*3/mm3 101* 107*  --  135*     Cultures:   10/30 blood cx 1 of 2 coag negative staph  10/30 urine cx gram negative    Dosing hx (include troughs if drawn):  10/129341: 1gm IV x1  310 random level: 18.4 mcg/ml    Assessment:  Scr worse today. Pt is oliguric per nephrology, but urine picking up now. Level this AM is within desired range of 15-20 mcg/ml. Per Dr. Ledesma, may be able to stop vancomycin soon when cultures finalized.    Plan:  1) Give vancomycin 1000 mg x1 today.  2) Next random level in AM tomorrow.  3. Continue current dose Zosyn 4.5gm IV q12h per extended infusion protocol.  Pharmacy will continue to follow and adjust as needed.    Megan Hammond [facility]    "

## 2017-11-01 NOTE — PLAN OF CARE
Problem: Patient Care Overview (Adult)  Goal: Plan of Care Review  Outcome: Ongoing (interventions implemented as appropriate)    11/01/17 1353   Coping/Psychosocial Response Interventions   Plan Of Care Reviewed With patient   Patient Care Overview   Progress unable to show any progress toward functional goals   Outcome Evaluation   Outcome Summary/Follow up Plan patient currently on vent, unable to arouse patient to actively participate in therapy. slight spontaneous movement of RUE noted but not to command. family at bedside, will decrease frequency of PT for now, medical testing ongoing

## 2017-11-01 NOTE — PLAN OF CARE
Problem: Patient Care Overview (Adult)  Goal: Plan of Care Review  Outcome: Ongoing (interventions implemented as appropriate)    11/01/17 8644   Coping/Psychosocial Response Interventions   Plan Of Care Reviewed With daughter   Outcome Evaluation   Outcome Summary/Follow up Plan Patient harder to arouse despite having no sedation. MD aware. CT of head done. Vital signs stable. Unable to wean off vent. Daughter updated on plan of care. Will continue to monitor closely.         Problem: Fall Risk (Adult)  Goal: Identify Related Risk Factors and Signs and Symptoms  Outcome: Outcome(s) achieved Date Met:  11/01/17  Goal: Absence of Falls  Outcome: Ongoing (interventions implemented as appropriate)    Problem: Mechanical Ventilation, Invasive (Adult)  Goal: Signs and Symptoms of Listed Potential Problems Will be Absent or Manageable (Mechanical Ventilation, Invasive)  Outcome: Ongoing (interventions implemented as appropriate)

## 2017-11-01 NOTE — PROGRESS NOTES
"   LOS: 2 days    Patient Care Team:  Nehemias Bradford MD as PCP - General (Family Medicine)    Chief Complaint:    Chief Complaint   Patient presents with   • Shortness of Breath   • Fatigue   • Vomiting       Subjective     Interval History:     Follow up MAURY.  On vent. Not sedated. Last pain medication 1 pm yesterday.     Review of Systems:   Unable to obtain    Objective     Vital Signs  Temp:  [97.6 °F (36.4 °C)-99.1 °F (37.3 °C)] 97.6 °F (36.4 °C)  Heart Rate:  [] 102  Resp:  [25-27] 26  BP: ()/() 125/77  Arterial Line BP: ()/(57-82) 86/80  FiO2 (%):  [40 %] 40 %    Flowsheet Rows         First Filed Value    Admission Height  64\" (162.6 cm) Documented at 10/30/2017 1455    Admission Weight  140 lb (63.5 kg) Documented at 10/30/2017 1455          I/O this shift:  In: 487 [I.V.:487]  Out: -   I/O last 3 completed shifts:  In: 3911 [I.V.:3660; IV Piggyback:251]  Out: 535 [Urine:425; Other:110]    Intake/Output Summary (Last 24 hours) at 11/01/17 0947  Last data filed at 11/01/17 0823   Gross per 24 hour   Intake             3368 ml   Output              355 ml   Net             3013 ml       Physical Exam:  On vent.  Does not open eyes or follow commands. Oral ETT.  RIJ TLC.    Heart RRR no s3 or rub.  Lungs upper airway rhonchi.  Abd Rare BS, dressing in place. No body  Wall edema.  Does not grimace to palpation.  Ext trace lower ext edema. Upper ext edema trace.       Results Review:      Results from last 7 days  Lab Units 11/01/17  0310 10/31/17  0417 10/30/17  1534   SODIUM mmol/L 137 133* 128*   POTASSIUM mmol/L 3.4* 3.6 3.9   CHLORIDE mmol/L 95* 91* 79*   CO2 mmol/L 22.5 27.0 27.6   BUN mg/dL 87* 81* 83*   CREATININE mg/dL 2.77* 2.40* 2.55*   CALCIUM mg/dL 6.9* 7.1* 8.9   BILIRUBIN mg/dL 1.2 1.8* 1.7*   ALK PHOS U/L 50 53 79   ALT (SGPT) U/L 69* 124* 239*   AST (SGOT) U/L 28 36* 107*   GLUCOSE mg/dL 137* 120* 250*       Estimated Creatinine Clearance: 14.7 mL/min (by C-G formula " based on Cr of 2.77).      Results from last 7 days  Lab Units 11/01/17  0310 10/31/17  0417   MAGNESIUM mg/dL 5.5* 5.5*   PHOSPHORUS mg/dL 4.6* 4.7*               Results from last 7 days  Lab Units 11/01/17  0310 10/31/17  0417 10/30/17  2135 10/30/17  1458   WBC 10*3/mm3 11.95* 9.54  --  13.33*   HEMOGLOBIN g/dL 14.9 16.9*  --  19.6*   HEMOGLOBIN, POC g/dL  --   --  15.3  --    PLATELETS 10*3/mm3 101* 107*  --  135*         Results from last 7 days  Lab Units 10/30/17  1534   INR  1.08         Imaging Results (last 24 hours)     Procedure Component Value Units Date/Time    XR Chest 1 View [[id number]] Collected:  10/31/17 0004     Updated:  11/01/17 0216    Narrative:       X-RAY CHEST 1 VIEW.     HISTORY: ET tube placement.     COMPARISON: 10/30/2017.     FINDINGS:  Cardiomediastinal silhouette is within normal limits. ET tube is in  position, tip is 2.5 cm from the micaela. Nasogastric tube is in good  position.     Low lung volumes and atelectasis of the left upper lobe.              Impression:       No acute findings. Tip of the ET tube is 2.5 cm from the micaela.     This report was finalized on 11/1/2017 2:12 AM by Dr. Richard Davila MD.       XR Chest Post CVA Port [[id number]] Collected:  10/31/17 0418     Updated:  11/01/17 0240    Narrative:       X-RAY CHEST POST CVA PORT.     HISTORY: Central line placement.     COMPARISON: 10/30/2017.     FINDINGS:  Cardiomediastinal silhouette is within normal limits. Right jugular  catheter has been placed, no pneumothorax, ET tube and NG tube remain  unchanged.     There is no consolidation or effusion. Mild atelectasis, lung volumes  are low.          Impression:       1.  Right jugular catheter tip is in the right atrium, no pneumothorax.  ET tube and NG tube remain unchanged.   2.  Low lung volumes and mild bilateral atelectasis.     This report was finalized on 11/1/2017 2:37 AM by Dr. Richard Davila MD.             albuterol 6 puff Inhalation Q4H - RT    famotidine 20 mg Intravenous Daily   heparin (porcine) 5,000 Units Subcutaneous Q8H   insulin aspart 0-24 Units Subcutaneous Q4H   piperacillin-tazobactam 4.5 g Intravenous Q12H   sennosides-docusate sodium 2 tablet Oral Nightly   Vancomycin Pharmacy Intermittent Dosing  Does not apply Daily       norepinephrine 0.02-0.3 mcg/kg/min Last Rate: Stopped (11/01/17 0022)   Pharmacy to dose vancomycin     Pharmacy to Dose Zosyn     phenylephrine 0.5-3 mcg/kg/min Last Rate: Stopped (10/31/17 0635)   propofol 5-50 mcg/kg/min Last Rate: Stopped (10/31/17 1738)   sodium chloride 100 mL/hr Last Rate: 100 mL/hr (11/01/17 0823)       Medication Review:   Current Facility-Administered Medications   Medication Dose Route Frequency Provider Last Rate Last Dose   • acetaminophen (TYLENOL) tablet 650 mg  650 mg Oral Q4H PRN Bryan Lakhani MD        Or   • acetaminophen (TYLENOL) suppository 650 mg  650 mg Rectal Q4H PRN Bryan Lakhani MD       • albuterol (PROVENTIL HFA;VENTOLIN HFA) inhaler 6 puff  6 puff Inhalation Q4H - RT Shay Ledesma MD   6 puff at 11/01/17 0813   • dextrose (D50W) solution 25 g  25 g Intravenous Q15 Min PRN Bryan Lakhani MD       • famotidine (PEPCID) injection 20 mg  20 mg Intravenous Daily Bryan Lakhani MD   20 mg at 11/01/17 0829   • fentaNYL citrate (PF) (SUBLIMAZE) injection 25 mcg  25 mcg Intravenous Q2H PRN Shay Ledesma MD   25 mcg at 10/31/17 1349   • glucagon (human recombinant) (GLUCAGEN DIAGNOSTIC) injection 1 mg  1 mg Subcutaneous Q15 Min PRN Bryan Lakhani MD       • heparin (porcine) 5000 UNIT/ML injection 5,000 Units  5,000 Units Subcutaneous Q8H Shay Ledesma MD   5,000 Units at 11/01/17 0504   • insulin aspart (novoLOG) injection 0-24 Units  0-24 Units Subcutaneous Q4H Shay Ledesma MD       • insulin aspart (novoLOG) injection 0-9 Units  0-9 Units Subcutaneous Q4H PRN Bryan Lakhani MD       • ipratropium-albuterol (DUO-NEB) nebulizer solution 3 mL  3 mL Nebulization Q2H PRN Bryan PITTMAN  MD Lesvia       • norepinephrine (LEVOPHED) 8 mg/250 mL (32 mcg/mL) in sodium chloride 0.9% infusion (premix)  0.02-0.3 mcg/kg/min Intravenous Titrated Bryan Lakhani MD   Stopped at 11/01/17 0022   • Pharmacy to dose vancomycin   Does not apply Continuous PRN Shay Ledesma MD       • Pharmacy to Dose Zosyn   Does not apply Continuous PRN Bryan Lakhani MD       • phenylephrine (BJ-SYNEPHRINE) 50 mg in sodium chloride 0.9 % 250 mL (0.2 mg/mL) infusion  0.5-3 mcg/kg/min Intravenous Titrated Massimo Merlos MD   Stopped at 10/31/17 0635   • piperacillin-tazobactam (ZOSYN) 4.5 g in iso-osmotic dextrose 100 mL IVPB (premix)  4.5 g Intravenous Q12H Bryan Lakhani MD   4.5 g at 11/01/17 0505   • propofol (DIPRIVAN) infusion 10 mg/mL 100 mL  5-50 mcg/kg/min Intravenous Titrated Massimo Merlos MD   Stopped at 10/31/17 1738   • sennosides-docusate sodium (SENOKOT-S) 8.6-50 MG tablet 2 tablet  2 tablet Oral Nightly Bryan Lakhani MD   2 tablet at 10/31/17 2004   • sodium chloride 0.9 % flush 1-10 mL  1-10 mL Intravenous PRN Bryan Lakhani MD       • sodium chloride 0.9 % flush 10 mL  10 mL Intravenous PRN Carlito Perez MD       • sodium chloride 0.9 % flush 10 mL  10 mL Intravenous PRN Carlito Perez MD       • sodium chloride 0.9 % infusion  100 mL/hr Intravenous Continuous Bryan Lakhani  mL/hr at 11/01/17 0823 100 mL/hr at 11/01/17 0823   • Vancomycin Pharmacy Intermittent Dosing   Does not apply Daily Shay Ledesma MD           Assessment/Plan   1. MAURY versus MAURY on CKD 9.  Pre-renal with urine sodium less than 20. Oligoanuric, but now urine picking up this am.  Waste products up as expected, K low, Bicarb ok.   Continue NS.  CXR clear, only on FI02 40%  2. SP Exp lap for incarcerated  Ventral hernia.  POD 2.   3. Acute on chronic respiratory failure. On vent  4. PAD, prior CVA  5. Elevated LFT's improving.   6. Altered mental status. Not requiring any pain med or restraint. May need CT.  7.  TCP.   On heparin subq and pepcid.  Change pepcid to protonix.     aCra Orozco MD  11/01/17  9:47 AM

## 2017-11-02 NOTE — PROGRESS NOTES
"   LOS: 3 days   Patient Care Team:  Nehemias Bradford MD as PCP - General (Family Medicine)    Chief Complaint: S/P exploratory laparotomy with small bowel resection    Subjective     History of Present Illness    Subjective    The patient remains on the ventilator and unresponsive despite no sedation.  She does move her right arm.    Objective     Vital Signs  Temp:  [98.1 °F (36.7 °C)-99.5 °F (37.5 °C)] 99.4 °F (37.4 °C)  Heart Rate:  [] 98  Resp:  [22-29] 22  BP: (104-159)/(65-97) 159/94  FiO2 (%):  [40 %] 40 %    Objective:  General Appearance:  General patient appearance: Unresponsive.    Vital signs: (most recent): Blood pressure 159/94, pulse 98, temperature 99.4 °F (37.4 °C), temperature source Oral, resp. rate 22, height 64\" (162.6 cm), weight 148 lb 5.9 oz (67.3 kg), SpO2 93 %.    Abdomen: Abdomen is soft and non-distended.  Bowel sounds are normal.   There is no abdominal tenderness.  (No apparent tenderness).               Results Review:     I reviewed the patient's new clinical results.    Medication Review: Reviewed.    Assessment/Plan     Principal Problem:    Acute on chronic respiratory failure with hypoxia  Active Problems:    Incarcerated ventral hernia    Acute respiratory failure with hypoxia      Assessment & Plan     The patient is recovering from her exploratory laparotomy with small bowel resection.  Her bowel function is slowly returning.  The NG tube output remains bilious and it is not ready to be removed.    The patient's neurologic status remains a concern and a neurology consult has been requested.    Zuhair Wade Jr., MD  11/02/17  10:50 AM          "

## 2017-11-02 NOTE — PLAN OF CARE
Problem: Patient Care Overview (Adult)  Goal: Plan of Care Review  Outcome: Ongoing (interventions implemented as appropriate)  The patient has done well throughout the shift. She has produced more urine through the shift than she has the past two previous ones, her creatinine has dropped from 2.7 to 2.3. She still remains unresponsive, though she will withdraw from pain and has been moving her right hand frequently but not purposefully. No drainage or signs of infection at the abdominal incision site.        11/02/17 0354   Coping/Psychosocial Response Interventions   Plan Of Care Reviewed With patient   Patient Care Overview   Progress progress toward functional goals as expected         Problem: SAFETY - NON-VIOLENT RESTRAINT  Goal: Remains free of injury from restraints (Non-Violent Restraint)  Outcome: Ongoing (interventions implemented as appropriate)  Goal: Free from restraint(s) (Non-Violent Restraint)  Outcome: Ongoing (interventions implemented as appropriate)

## 2017-11-02 NOTE — PROGRESS NOTES
"   LOS: 3 days    Patient Care Team:  Nehemias Bradford MD as PCP - General (Family Medicine)    Chief Complaint:    Chief Complaint   Patient presents with   • Shortness of Breath   • Fatigue   • Vomiting       Subjective     Interval History:     Follow up MAURY.  On vent. Opens eyes to voice.  Does squeeze with right hand and moves right toes. No movement on left.     Review of Systems:   Unable to obtain    Objective     Vital Signs  Temp:  [98.1 °F (36.7 °C)-99.5 °F (37.5 °C)] 99.2 °F (37.3 °C)  Heart Rate:  [] 96  Resp:  [22-29] 22  BP: (104-158)/(65-97) 155/93  FiO2 (%):  [40 %] 40 %    Flowsheet Rows         First Filed Value    Admission Height  64\" (162.6 cm) Documented at 10/30/2017 1455    Admission Weight  140 lb (63.5 kg) Documented at 10/30/2017 1455             I/O last 3 completed shifts:  In: 3859 [I.V.:3859]  Out: 1275 [Urine:975; Other:300]    Intake/Output Summary (Last 24 hours) at 11/02/17 0713  Last data filed at 11/02/17 0400   Gross per 24 hour   Intake             2869 ml   Output             1100 ml   Net             1769 ml       Physical Exam:  On vent. Opens eyes and squeezes with right hand.  Moves right toes.  Oral ETT.  RIJ TLC day 2.   Heart RRR no s3 or rub.  Lungs upper airway rhonchi.  Abd Rare BS, dressing in place.   No body wall edema.  Does not grimace to palpation.  Ext trace lower ext edema. Upper ext trace edema.       Results Review:      Results from last 7 days  Lab Units 11/02/17  0240 11/01/17  1527 11/01/17  0310 10/31/17  0417   SODIUM mmol/L 141  --  137 133*   POTASSIUM mmol/L 3.5 3.6 3.4* 3.6   CHLORIDE mmol/L 102  --  95* 91*   CO2 mmol/L 20.9*  --  22.5 27.0   BUN mg/dL 81*  --  87* 81*   CREATININE mg/dL 2.32*  --  2.77* 2.40*   CALCIUM mg/dL 6.7*  --  6.9* 7.1*   BILIRUBIN mg/dL 0.6  --  1.2 1.8*   ALK PHOS U/L 53  --  50 53   ALT (SGPT) U/L 48*  --  69* 124*   AST (SGOT) U/L 37*  --  28 36*   GLUCOSE mg/dL 138*  --  137* 120*       Estimated Creatinine " Clearance: 17.6 mL/min (by C-G formula based on Cr of 2.32).      Results from last 7 days  Lab Units 11/02/17  0240 11/01/17  0310 10/31/17  0417   MAGNESIUM mg/dL  --  5.5* 5.5*   PHOSPHORUS mg/dL 3.7 4.6* 4.7*               Results from last 7 days  Lab Units 11/02/17  0240 11/01/17  0310 10/31/17  0417 10/30/17  2135 10/30/17  1458   WBC 10*3/mm3 12.92* 11.95* 9.54  --  13.33*   HEMOGLOBIN g/dL 13.6 14.9 16.9*  --  19.6*   HEMOGLOBIN, POC g/dL  --   --   --  15.3  --    PLATELETS 10*3/mm3 126* 101* 107*  --  135*         Results from last 7 days  Lab Units 10/30/17  1534   INR  1.08         Imaging Results (last 24 hours)     Procedure Component Value Units Date/Time    CT Head Without Contrast [[id number]] Collected:  11/01/17 1343     Updated:  11/01/17 1642    Narrative:       CT SCAN OF THE HEAD WITHOUT CONTRAST     CLINICAL HISTORY: Neurologic changes. Unresponsive.     CT scan of the head was obtained with 3 mm axial images. No intravenous  contrast was administered.     FINDINGS:     The ventricles, sulci, and cisterns are age appropriate. The basal  ganglia and thalami are unremarkable in appearance. Moderate changes of  chronic small vessel ischemic phenomena are noted. Incidentally noted  are subcentimeter chronic infarcts within both cerebellar hemispheres.  Atherosclerotic changes are appreciated within the intracranial  vasculature.     Incidentally noted is a chronic appearing fracture involving the right  mandibular condyle. A feeding tube is incidentally noted within the  right aspect of the nasal cavity.       Impression:          Moderate changes of chronic small vessel ischemic phenomena are noted.  Otherwise, there is no evidence for acute intracranial pathology.     Radiation dose reduction techniques were utilized, including automated  exposure control and exposure modulation based on body size.     This report was finalized on 11/1/2017 4:39 PM by Dr. Matt Lim MD.             albuterol  6 puff Inhalation Q4H - RT   heparin (porcine) 5,000 Units Subcutaneous Q8H   insulin aspart 0-24 Units Subcutaneous Q4H   pantoprazole 40 mg Intravenous Q AM   piperacillin-tazobactam 4.5 g Intravenous Q12H   sennosides-docusate sodium 2 tablet Oral Nightly   Vancomycin Pharmacy Intermittent Dosing  Does not apply Daily       norepinephrine 0.02-0.3 mcg/kg/min Last Rate: Stopped (11/01/17 0022)   Pharmacy to dose vancomycin     Pharmacy to Dose Zosyn     phenylephrine 0.5-3 mcg/kg/min Last Rate: Stopped (10/31/17 0635)   propofol 5-50 mcg/kg/min Last Rate: Stopped (10/31/17 1738)   sodium chloride 100 mL/hr Last Rate: 100 mL/hr (11/01/17 0823)       Medication Review:   Current Facility-Administered Medications   Medication Dose Route Frequency Provider Last Rate Last Dose   • acetaminophen (TYLENOL) tablet 650 mg  650 mg Oral Q4H PRN Bryan Lakhani MD        Or   • acetaminophen (TYLENOL) suppository 650 mg  650 mg Rectal Q4H PRN Bryan Lakhani MD       • albuterol (PROVENTIL HFA;VENTOLIN HFA) inhaler 6 puff  6 puff Inhalation Q4H - RT Shay Ledesma MD   6 puff at 11/02/17 0702   • dextrose (D50W) solution 25 g  25 g Intravenous Q15 Min PRN Bryan Lakhani MD       • fentaNYL citrate (PF) (SUBLIMAZE) injection 25 mcg  25 mcg Intravenous Q2H PRN Shay Ledesma MD   25 mcg at 10/31/17 1349   • glucagon (human recombinant) (GLUCAGEN DIAGNOSTIC) injection 1 mg  1 mg Subcutaneous Q15 Min PRN Bryan Lakhani MD       • heparin (porcine) 5000 UNIT/ML injection 5,000 Units  5,000 Units Subcutaneous Q8H Shay Ledesma MD   5,000 Units at 11/02/17 0608   • insulin aspart (novoLOG) injection 0-24 Units  0-24 Units Subcutaneous Q4H Shay Ledesma MD       • insulin aspart (novoLOG) injection 0-9 Units  0-9 Units Subcutaneous Q4H PRN Bryan Lakhani MD       • ipratropium-albuterol (DUO-NEB) nebulizer solution 3 mL  3 mL Nebulization Q2H PRN Bryan Lakhani MD       • norepinephrine (LEVOPHED) 8 mg/250 mL (32 mcg/mL) in sodium chloride  0.9% infusion (premix)  0.02-0.3 mcg/kg/min Intravenous Titrated Bryan Lakhani MD   Stopped at 11/01/17 0022   • pantoprazole (PROTONIX) injection 40 mg  40 mg Intravenous Q AM Cara Orozco MD   40 mg at 11/02/17 0608   • Pharmacy to dose vancomycin   Does not apply Continuous PRN Shay Ledesma MD       • Pharmacy to Dose Zosyn   Does not apply Continuous PRN Bryan Lakhani MD       • phenylephrine (BJ-SYNEPHRINE) 50 mg in sodium chloride 0.9 % 250 mL (0.2 mg/mL) infusion  0.5-3 mcg/kg/min Intravenous Titrated Massimo Merlos MD   Stopped at 10/31/17 0635   • piperacillin-tazobactam (ZOSYN) 4.5 g in iso-osmotic dextrose 100 mL IVPB (premix)  4.5 g Intravenous Q12H Bryan Lakhani MD   4.5 g at 11/02/17 0608   • propofol (DIPRIVAN) infusion 10 mg/mL 100 mL  5-50 mcg/kg/min Intravenous Titrated Massimo Merlos MD   Stopped at 10/31/17 1738   • sennosides-docusate sodium (SENOKOT-S) 8.6-50 MG tablet 2 tablet  2 tablet Oral Nightly Bryan Lakhani MD   2 tablet at 11/01/17 2206   • sodium chloride 0.9 % flush 1-10 mL  1-10 mL Intravenous PRN Bryan Lakhani MD       • sodium chloride 0.9 % flush 10 mL  10 mL Intravenous PRN Carlito Perez MD       • sodium chloride 0.9 % flush 10 mL  10 mL Intravenous PRN Carlito Perez MD       • sodium chloride 0.9 % infusion  100 mL/hr Intravenous Continuous Bryan Lakhani  mL/hr at 11/01/17 0823 100 mL/hr at 11/01/17 0823   • Vancomycin Pharmacy Intermittent Dosing   Does not apply Daily Shay Ledesma MD           Assessment/Plan   1. MAURY versus MAURY on CKD 9.  Pre-renal with urine sodium less than 20.  Now non-oliguric with 800 cc past 24 hours.  Waste products better .  Replace K. Reduce IVF.   2. SP Exp lap for incarcerated  Ventral hernia.  POD 3.   3. Acute on chronic respiratory failure. On vent  4. PAD, prior CVA  5. Elevated LFT's improving.   6. Altered mental status. Better today, although does not move left arm or leg.  Prior CVA per notes but not  documented what distribution. , CT head yesterday negative except chronic small vessel changes. EEG ordered.  Has history of seizure.  7.  TCP.  On heparin subq.  Changed pepcid to protonix yesterday.  Platelets a little better.   8. 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   9. Klebsiella UTI on zosyn day 3.    10. Coag neg staph bacteremia. On vanc.   11. HTN.  Resume lopressor iv prn for systolic over 160 or diastolic over 100.     Cara Orozco MD  11/02/17  7:13 AM

## 2017-11-02 NOTE — PROGRESS NOTES
"Pharmacokinetic Evaluation - Vancomycin and Zosyn    Kamryn Bradford is a 82 y.o. female on vancomycin and Zosyn pharmacy to dose.  MRN: [id number]  : 1934    Day of vancomycin therapy: 3  Day of Zosyn therapy: 4  Indication: sepsis  Consulted by: Dr. Ledesma/ Dr. LUIS Lakhani  Goal trough: 15-20 mcg/ml    Current vancomycin dose: intermittent  Current Zosyn dose 4.5gm IV q12h    Blood pressure 159/94, pulse 102, temperature 98.4 °F (36.9 °C), temperature source Oral, resp. rate 22, height 64\" (162.6 cm), weight 148 lb 5.9 oz (67.3 kg), SpO2 94 %.    Results from last 7 days  Lab Units 11/02/17  0240 11/01/17  0310 10/31/17  0417   CREATININE mg/dL 2.32* 2.77* 2.40*     Estimated Creatinine Clearance: 17.6 mL/min (by C-G formula based on Cr of 2.32).    Results from last 7 days  Lab Units 11/02/17  0240 11/01/17  0310 10/31/17  0417   WBC 10*3/mm3 12.92* 11.95* 9.54   HEMOGLOBIN g/dL 13.6 14.9 16.9*   HEMATOCRIT % 42.3 45.5 51.1*   PLATELETS 10*3/mm3 126* 101* 107*     Cultures:   10/30 blood cx 1 of 2 coag negative staph  10/30 urine cx >100k cfu K. pneumo    Dosing hx (include troughs if drawn):  [id number]: 1gm IV x1  310 random level: 18.4 mcg/ml; 1000mg IV x1 @ 1539  240 random level: 24.9 mcg/ml; 1349 random level: 19.5 mcg/ml    Assessment:  Pt is s/p ex lap with small bowel resection for incarcerated ventral hernia. No cultures from abdominal source available. Per Dr. Ledesma, cont vanco for at least 1 more day.  Scr improved somewhat today. Random level this AM is above desired range, but at ~12hr post dose. Recheck this afternoon is 19.5 mcg/ml.    Plan:  1) Will give vancomycin 500mg IV x1 today to maintain therapeutic level overnight.  2) Random level tomorrow at noon if pt to remain on vancomycin.   3. Continue current dose Zosyn 4.5gm IV q12h per extended infusion protocol.  Pharmacy will continue to follow and adjust as needed.    Megan Hammond, [facility]    "

## 2017-11-02 NOTE — CONSULTS
NEUROLOGY CONSULTATION        PATIENT Kamryn Bradford    1934   MRN [id number]   ADMIT DATE 10/30/2017   LENGTH OF STAY 3 days   ATTENDING Shay Ledesma MD       HISTORY OF PRESENT ILLNESS:  This is an 82-year-old woman admitted for vomiting lethargy abdominal pain.  Daughter says that on the night of admission she was in bed vomiting or up to a wheelchair to clean up call bed.  The daughter says she was minimally responsive at that point.  She had her bowel rate upon days ago.  However she's continued to be stuporous and then no seizure activity is been seen.  Other the nursing staff and the intensivists of note left-sided weakness.  He thinks that before coming the hospital when she was in a wheelchair described above that there was some left-sided weakness but no physician noticed it until after the surgery.  Remote history of stroke but no records in the computer concerning that and no residual deficit.  He also has a history of a seizure disorder but has been off Dilantin for 2 years with no seizures.    CHIEF COMPLAINT: Shortness of Breath; Fatigue; and Vomiting      DIAGNOSIS: Incarcerated ventral hernia [K46.0]  Acute respiratory failure with hypoxia [J96.01]  Sepsis, due to unspecified organism [A41.9]  Acute renal failure, unspecified acute renal failure type [N17.9]      PAST MEDICAL HISTORY:   Past Medical History:   Diagnosis Date   • Hernia of abdominal wall    • PONV (postoperative nausea and vomiting)    • Seizures    • Stroke        PAST SURGICAL HISTORY:  Past Surgical History:   Procedure Laterality Date   • APPENDECTOMY     • CHOLECYSTECTOMY     • EXPLORATORY LAPAROTOMY N/A 10/30/2017    Procedure: LAPAROTOMY EXPLORATORY WITH SMALL BOWEL RESECTION;  Surgeon: Zuhair Wade Jr., MD;  Location: [facility];  Service:    • HYSTERECTOMY     • PARTIAL GASTRECTOMY     • ROTATOR CUFF REPAIR Right        CURRENT MEDICATIONS:  Scheduled Medications:  albuterol 6 puff Inhalation Q4H - RT   heparin  "(porcine) 5,000 Units Subcutaneous Q8H   insulin aspart 0-24 Units Subcutaneous Q4H   [START ON 11/3/2017] lansoprazole 30 mg Nasogastric QAM   piperacillin-tazobactam 4.5 g Intravenous Q12H   sennosides-docusate sodium 2 tablet Oral Nightly   Vancomycin Pharmacy Intermittent Dosing  Does not apply Daily     Infusions:   hold 1 each    norepinephrine 0.02-0.3 mcg/kg/min Last Rate: Stopped (11/01/17 0022)   Pharmacy to dose vancomycin     Pharmacy to Dose Zosyn     phenylephrine 0.5-3 mcg/kg/min Last Rate: Stopped (10/31/17 0635)   propofol 5-50 mcg/kg/min Last Rate: Stopped (10/31/17 7028)   sodium chloride 50 mL/hr Last Rate: 50 mL/hr (11/02/17 0722)     PRN Medications:  •  acetaminophen **OR** acetaminophen  •  dextrose  •  fentaNYL citrate (PF)  •  glucagon (human recombinant)  •  hold  •  insulin aspart  •  ipratropium-albuterol  •  metoprolol tartrate  •  Pharmacy to dose vancomycin  •  Pharmacy to Dose Zosyn  •  sodium chloride  •  sodium chloride  •  Insert peripheral IV **AND** sodium chloride    SOCIAL HISTORY:  Social History     Social History   • Marital status:      Spouse name: N/A   • Number of children: N/A   • Years of education: N/A     Social History Main Topics   • Smoking status: Current Every Day Smoker     Packs/day: 0.50   • Smokeless tobacco: Never Used   • Alcohol use No   • Drug use: No   • Sexual activity: Defer     Other Topics Concern   • None     Social History Narrative       FAMILY HISTORY:   I have reviewed this patient's family history and it is not significant to the present illness.      REVIEW OF SYSTEMS: 14 system review performed and all other systems are negative except for Shortness of Breath; Fatigue; and Vomiting         PHYSICAL EXAM:  GENERAL: Reveals a man/woman who appears his/her stated age, in no acute distress.  VITAL SIGNS: /94  Pulse 102  Temp 98.4 °F (36.9 °C) (Oral)   Resp 22  Ht 64\" (162.6 cm)  Wt 148 lb 5.9 oz (67.3 kg)  SpO2 94%  BMI " 25.47 kg/m2  NECK: Carotids are equal without bruits.   HEART: Regular rate and rhythm with no significant murmur or gallop.   EXTREMITIES: Nonedematous. Pulses are intact. There is no rash. There is no hepatosplenomegaly. No respiratory distress. There is no lymphadenopathy. There are no signs of cutaneous embolization.  NEUROLOGIC: Patient is lying in bed stuporous.  She'll open her eyes or ice screamed at her she wouldn't follow commands and she will look at me.  Pupils are equal and reactive.  There is a slight degree of horizontal eye movements bilaterally with doll's eyes but surely not complete.  Needle reflex was present on the right and absent on the left he grimaced to supraorbital pain on the right than the left.  Gag reflexes are present bilaterally.  Do not follow any commands today and did not attempt to speak.  He clearly moves left arm less than the right.  With sternal rub there was a slight movement of the left hand but the right hand against gravity.  There is very little movement of either leg.  Reflexes are diffusely decreased both toes are upgoing sensations difficult to assess cerebellar function and gait cannot be assessed.  Neck is supple.    DIAGNOSTIC DATA:   Labs reviewed and revealed no significant abnormalities.     Radiology images personally reviewed and revealed no significant abnormalities.  Specifically CT scan of brain images are reviewed and show no significant abnormalities.    She had an EEG that showed diffuse slowing with some occasional triphasic waves and possibly a little left-sided slowing..  Regardless I'll review this is a sign of a diffuse encephalopathy.      IMPRESSION: It is possible that there is a component of metabolic encephalopathy related to sepsis but the etiology of the metabolic derangement causing this proposed encephalopathy is not entirely clear.  In Addition she clearly has focality in the left arm and left leg so a cerebral infarcts needs to be  excluded.  If stroke was to explain her encephalopathy, given that there is no large stroke with mass effect on CT we have to hypothesize multiple bilateral infarcts or brainstem involvement.  There is no convincing signs on exam of brainstem involvement though I couldn't get good complete eye movements with doll's eyes. Pupils did react equally.  Therefore multiple bilateral stroke is more likely.    I would suggest that we proceed with an MRI of the brain without contrast (renal dysfunction) and do a lumbar puncture.  EEG showed no seizure activity but it was just routine EEG and if we still don't have an explanation for this we may need to prolonged EEG monitor.  Send blood for B12, TSH and NH3..  I will follow her with you.      Thank you for allowing me to see this patient.    Genaro Han MD  11/2/2017  12:32 PM

## 2017-11-02 NOTE — PROGRESS NOTES
[facility]  Phone: [phone]  Shay Ledesma MD    Subjective:  LOS: 3    Moves right arm spontaneously but rest appears flaccid. Does not open eyes though appears awake. Remains on vent and not on sedation at this time.    Objective   Vital Signs past 24hrs  BP range: BP: (104-158)/(65-97) 146/91  Pulse range: Heart Rate:  [] 88  Resp rate range: Resp:  [22-29] 22  Temp range: Temp (24hrs), Av °F (37.2 °C), Min:98.1 °F (36.7 °C), Max:99.5 °F (37.5 °C)    O2 Device: mechanical ventilator   Oxygen range:SpO2:  [93 %-98 %] 94 %   148 lb 5.9 oz (67.3 kg); Body mass index is 25.47 kg/(m^2).    Intake/Output Summary (Last 24 hours) at 17 0801  Last data filed at 17 0400   Gross per 24 hour   Intake             2869 ml   Output             1000 ml   Net             1869 ml       Physical Exam   Constitutional: She appears well-developed. She is intubated.   Eyes: Conjunctivae are normal.   Neck: Normal range of motion. Neck supple.   Cardiovascular: Normal rate and regular rhythm.    No murmur heard.  Pulmonary/Chest: Effort normal. She is intubated. No respiratory distress. She has no wheezes. She has no rales.   Abdominal: Soft. She exhibits no mass. Bowel sounds are absent. There is no tenderness (does not wince to palpation).   Surgical wound   Musculoskeletal: She exhibits no edema.   Neurological:   RUE moves spontaneously  LUE winces but does not withdraw to pain  Pupils ?   Skin: Skin is warm. No rash noted.     Results Review:    I have reviewed the laboratory and imaging data since the last note by [facility] physician.  My annotations are noted in assessment and plan.    Medication Review:  I have reviewed the current MAR.  My annotations are noted in assessment and plan.      norepinephrine 0.02-0.3 mcg/kg/min Last Rate: Stopped (17 0022)   Pharmacy to dose vancomycin     Pharmacy to Dose Zosyn     phenylephrine 0.5-3 mcg/kg/min Last Rate: Stopped (10/31/17 0635)   propofol  5-50 mcg/kg/min Last Rate: Stopped (10/31/17 7838)   sodium chloride 50 mL/hr Last Rate: 50 mL/hr (11/02/17 3107)     Plan   PCCM Problems  Acute respiratory failure, vent 10/30  Septic  shock  Bowel perf with incarc hernia, op 10/30  MAURY  Elevated troponin  Elevated LFT's  ? Neuro changes  Relevant Medical Diagnoses  ? CKD  Hx CVA  Sz disorder  Cigarette smoker    Plan of Treatment  On vent. Trial PS mode again. Will attempt weaning. May be limited by her mental status.    Pending Neuro consult. Markedly abnormal EEG but no focal seizures. CT head yesterday did not show any acute stroke. Possible MRI. Will attempt to extubate.    Remains on fluids as per renal service. Now off pressors.    Her bowels remain silent.  She is on antibiotics.  Her urine cultures show gram-negatives.  A blood culture was coagulase-negative staph.  No cultures from the abdomen.  Continue both abx for now.  Possibly dc in a day or so.     I spent +35 mins critical care time in care of this patient outside of any procedures.     Shay Ledesma MD  11/02/17  8:01 AM    Part of this note may be an electronic transcription/translation of spoken language to printed text using the Dragon Dictation System.

## 2017-11-02 NOTE — PROGRESS NOTES
Discharge Planning Assessment  [facility]     Patient Name: Kamryn Bradford  MRN: [id number]  Today's Date: 11/2/2017    Admit Date: 10/30/2017          Discharge Needs Assessment       11/02/17 1427    Living Environment    Lives With child(balaji), dependent   two adult sons live with her but are not caregivers    Living Arrangements house    Transportation Available family or friend will provide    Living Environment    Provides Primary Care For no one, unable/limited ability to care for self    Quality Of Family Relationships unable to assess    Able to Return to Prior Living Arrangements no    Discharge Needs Assessment    Concerns To Be Addressed discharge planning concerns    Concerns Comments will need SNF for rehab, probably followed by long term care per her daughter    Readmission Within The Last 30 Days no previous admission in last 30 days    Anticipated Changes Related to Illness inability to care for self    Equipment Currently Used at Home none    Equipment Needed After Discharge --   DENISA    Discharge Facility/Level Of Care Needs nursing facility, skilled    Current Discharge Risk dependent with mobility/activities of daily living    Discharge Disposition still a patient            Discharge Plan       11/02/17 1424    Case Management/Social Work Plan    Plan Undetermined    Patient/Family In Agreement With Plan yes    Additional Comments Spoke with pt's daughter Lor at bedside.  Introduced self and explained role.  CCP contact information provided.  Face sheet and pharmacy verified and IMM received.  Pt's discharge needs are not yet known due to her condition.  Per her daughter, she will need long term care probably.  Per her daughter, pt was unable to care for herself prior to discharge.  She was not taking her medication.  Gave RtoR and SNF list for review.  CCP will follow.        Discharge Placement     No information found                Demographic Summary       11/02/17 3169    Referral  "Information    Admission Type inpatient    Arrived From admitted as an inpatient;home or self-care    Primary Care Physician Information    Name Nehemias Bradford MD  ([facility])            Functional Status       11/02/17 1425    Functional Status Prior    Ambulation 0-->independent    Transferring 0-->independent    Toileting 0-->independent    Bathing 0-->independent    Dressing 0-->independent    Eating 0-->independent    Communication 0-->understands/communicates without difficulty    Swallowing 0-->swallows foods/liquids without difficulty    IADL    Medications independent    Meal Preparation independent    Housekeeping assistive person    Laundry completely dependent    Shopping completely dependent    Oral Care independent    IADL Comments per pt's daughter, pt was independent but was \"not doing a very good job of taking care of herself\".  One of her three adult children would do pt's shopping.            Psychosocial     None            Abuse/Neglect     None            Legal     None            Substance Abuse     None            Patient Forms       11/02/17 1434    Patient Forms    Provider Choice List Delivered   RtoR and SNF list    Delivered to Patient    Method of delivery In person          Sunitha Cowan RN    "

## 2017-11-03 NOTE — CONSULTS
Patient Name: Kamryn Bradford  :1934  82 y.o.    Date of Admission: 10/30/2017  Date of Consultation:  17  Encounter Provider: Madina Goss MD  Place of Service: [facility] CARDIOLOGY  Referring Provider: Shay Ledesma MD  Patient Care Team:  Nehemias Bradford MD as PCP - General (Family Medicine)      Chief complaint: Consulted for SARAH     History of Present Illness:     Mrs. Bradford 82-year-old female who is sedated and orally intubated on the ventilator.  She has a history of seizures, stroke and tobacco use.  She was admitted 10/30/70 with acute hypoxic respiratory failure, sepsis and acute renal failure as well as incarcerated hernia.  She had an exploratory laparotomy with small bowel resection done 10/30/17 and now has an open abdominal wound.  Yesterday, she was flaccid on the left side.  CT of her head shows multiple infarcts.  We were consulted for possible SARAH.  She has coagulase negative Staphylococcus in her blood cultures.  They're concerned that she has endocarditis as the source of her infarcts.    At this time she is very ill.  Dr. Ledesma discussed with the family her course.  They're leaning towards palliative care.    She is unable to answer any questions due to her neurologic status and sedation.        Past Medical History:   Diagnosis Date   • Hernia of abdominal wall    • PONV (postoperative nausea and vomiting)    • Seizures    • Stroke        Past Surgical History:   Procedure Laterality Date   • APPENDECTOMY     • CHOLECYSTECTOMY     • EXPLORATORY LAPAROTOMY N/A 10/30/2017    Procedure: LAPAROTOMY EXPLORATORY WITH SMALL BOWEL RESECTION;  Surgeon: Zuhair Wade Jr., MD;  Location: [facility];  Service:    • HYSTERECTOMY     • PARTIAL GASTRECTOMY     • ROTATOR CUFF REPAIR Right          Prior to Admission medications    Medication Sig Start Date End Date Taking? Authorizing Provider   clonazePAM (KlonoPIN) 0.5 MG tablet Take 0.25 mg by mouth  "2 (Two) Times a Day As Needed for Anxiety or Seizures.    Historical Provider, MD   metoprolol tartrate (LOPRESSOR) 100 MG tablet Take 100 mg by mouth Daily.    Historical Provider, MD   phenytoin (DILANTIN) 100 MG ER capsule Take 200 mg by mouth Every Morning. Per pharmacist at [facility], patient takes phenytoin 100mg caps; 200mg qAM, then 100mg TID (times of administration after morning dose unknown). Last filled May 2015.    Historical Provider, MD   phenytoin (DILANTIN) 100 MG ER capsule Take 100 mg by mouth 3 (Three) Times a Day. Per pharmacist at [facility], patient takes phenytoin 100mg caps; 200mg qAM, then 100mg TID (times of administration after morning dose unknown). Last filled May 2015.    Historical Provider, MD   PHENYTOIN PO Take  by mouth.    Historical Provider, MD       Allergies   Allergen Reactions   • Dilaudid [Hydromorphone Hcl] Anaphylaxis   • Morphine And Related Anaphylaxis     Daughter said \"they lost her and had to bring her back last time she received this.\"       Social History     Social History   • Marital status:      Spouse name: N/A   • Number of children: N/A   • Years of education: N/A     Social History Main Topics   • Smoking status: Current Every Day Smoker     Packs/day: 0.50   • Smokeless tobacco: Never Used   • Alcohol use No   • Drug use: No   • Sexual activity: Defer     Other Topics Concern   • None     Social History Narrative       History reviewed. No pertinent family history.    REVIEW OF SYSTEMS:   All systems reviewed.  Pertinent positives identified in HPI.  All other systems are negative.      Objective:     Vitals:    11/03/17 1103 11/03/17 1143 11/03/17 1203 11/03/17 1213   BP: (!) 190/100  (!) 191/106    BP Location:       Patient Position:       Pulse: 82 65 73    Resp:  17     Temp:    98.3 °F (36.8 °C)   TempSrc:    Oral   SpO2: 96% 95% 96%    Weight:       Height:         Body mass index is 25.47 kg/(m^2).    General Appearance:    Sedate on vent "   Head:    Normocephalic, without obvious abnormality, atraumatic   Eyes:            Lids and lashes normal, conjunctivae and sclerae normal, no   icterus, no pallor, corneas clear   Ears:    Ears appear intact with no abnormalities noted   Throat:   No oral lesions, no thrush, oral mucosa moist   Neck:   No adenopathy, supple, trachea midline, no thyromegaly, no   carotid bruit, no JVD       Lungs:     Clear to auscultation,respirations regular, even and unlabored    Heart:    Regular rhythm and normal rate, normal S1 and S2, no murmur, no gallop, no rub, no click   Chest Wall:    No abnormalities observed   Abdomen:     Normal bowel sounds, no masses, no organomegaly, soft        non-tender, non-distended, no guarding, no rebound  tenderness   Extremities:   Moves all extremities well, no edema, no cyanosis, no redness   Pulses:   Pulses palpable and equal bilaterally. Normal radial, carotid dorsalis pedis and posterior tibial pulses bilaterally.    Skin:  Psychiatric:   No bleeding, bruising or rash    sedate   Lab Review:       Results from last 7 days  Lab Units 11/03/17  0359   SODIUM mmol/L 144   POTASSIUM mmol/L 4.2   CHLORIDE mmol/L 109*   CO2 mmol/L 21.3*   BUN mg/dL 69*   CREATININE mg/dL 1.52*   CALCIUM mg/dL 7.2*   BILIRUBIN mg/dL 0.9   ALK PHOS U/L 64   ALT (SGPT) U/L 36*   AST (SGOT) U/L 31   GLUCOSE mg/dL 111*       Results from last 7 days  Lab Units 10/31/17  1203 10/31/17  0417 10/30/17  2328   TROPONIN T ng/mL 0.039* 0.042* 0.037*       Results from last 7 days  Lab Units 11/03/17  0359   WBC 10*3/mm3 9.88   HEMOGLOBIN g/dL 12.9   HEMATOCRIT % 42.1   PLATELETS 10*3/mm3 123*       Results from last 7 days  Lab Units 10/30/17  1534   INR  1.08       Results from last 7 days  Lab Units 11/03/17  0359   MAGNESIUM mg/dL 4.0*                        I personally viewed and interpreted the patient's EKG/Telemetry data.        Assessment and Plan:       1. Acute hypoxic respiratory failure on  vent  2. Acute renal failure  3. Sepsis with Staphylococcus  4. Status post incarcerated hernia  5. Status post small bowel resection with an open wound  6. Acute strokes, multiple.  Possibly due to endocarditis.  She is a candidate for a SARAH.  She is not a candidate for any kind of open heart procedure.  Let us know if you needed SARAH.    Call back in needed    Madina Goss MD  11/03/17  1:16 PM

## 2017-11-03 NOTE — PROGRESS NOTES
"   LOS: 4 days   Patient Care Team:  Nehemias Bradford MD as PCP - General (Family Medicine)    Chief Complaint: S/P Exploratory laparotomy with small bowel resection    Subjective     History of Present Illness    Subjective    The patient remains unresponsive.    Objective     Vital Signs  Temp:  [97.6 °F (36.4 °C)-98.5 °F (36.9 °C)] 98.3 °F (36.8 °C)  Heart Rate:  [57-84] 84  Resp:  [17-22] 17  BP: (113-195)/() 189/91  FiO2 (%):  [30 %-40 %] 30 %    Objective:  General Appearance:  Comfortable and in no acute distress.    Vital signs: (most recent): Blood pressure (!) 189/91, pulse 84, temperature 98.3 °F (36.8 °C), temperature source Oral, resp. rate 17, height 64\" (162.6 cm), weight 148 lb 5.9 oz (67.3 kg), SpO2 96 %, not currently breastfeeding.    Abdomen: Abdomen is soft and non-distended.  Hypoactive bowel sounds.   There is no abdominal tenderness.  (No tenderness apparent).               Results Review:     I reviewed the patient's new clinical results.    Medication Review: Reviewed.    Assessment/Plan      Principal Problem:    Acute on chronic respiratory failure with hypoxia  Active Problems:    Incarcerated ventral hernia    Acute respiratory failure with hypoxia      Assessment & Plan     The patient is POD 4 from an exploratory laparotomy with small bowel resection for perforated small bowel in an incarcerated ventral hernia.  An expected ileus is present but tube feeds could be attempted depending on the family's plans regarding care.    Multiple CVAs on MRI of head.  Awaiting input of family regarding aggressiveness of care.    Zuhair Wade Jr., MD  11/03/17  2:42 PM          "

## 2017-11-03 NOTE — SIGNIFICANT NOTE
11/03/17 1022   Rehab Treatment   Discipline physical therapist   Treatment Not Performed other (see comments)  (no change in neuro status, pt unable to participate in PT, nsg performing ROM. will sign off for now, please reconsult if appropriate. )

## 2017-11-03 NOTE — PLAN OF CARE
Problem: Patient Care Overview (Adult)  Goal: Plan of Care Review  Outcome: Ongoing (interventions implemented as appropriate)    11/03/17 0713   Coping/Psychosocial Response Interventions   Plan Of Care Reviewed With patient   Patient Care Overview   Progress no change   Outcome Evaluation   Outcome Summary/Follow up Plan VSS, pt moving all extremeties but only right arm seems to be purposeful. Hypertensive when aggitated, treated with fent and metoprolol.         Problem: SAFETY - NON-VIOLENT RESTRAINT  Goal: Remains free of injury from restraints (Non-Violent Restraint)  Outcome: Ongoing (interventions implemented as appropriate)  Goal: Free from restraint(s) (Non-Violent Restraint)  Outcome: Ongoing (interventions implemented as appropriate)

## 2017-11-03 NOTE — PROGRESS NOTES
"   LOS: 4 days    Patient Care Team:  Nehemias Bradford MD as PCP - General (Family Medicine)    Chief Complaint:    Chief Complaint   Patient presents with   • Shortness of Breath   • Fatigue   • Vomiting       Subjective     Interval History:   Follow up MAURY.  On vent.  Not responsive; not sedated    Review of Systems:   Unable to obtain    Objective     Vital Signs  Temp:  [97.6 °F (36.4 °C)-98.5 °F (36.9 °C)] 98.3 °F (36.8 °C)  Heart Rate:  [57-84] 84  Resp:  [17-22] 17  BP: (113-195)/() 189/91  FiO2 (%):  [30 %-40 %] 30 %    Flowsheet Rows         First Filed Value    Admission Height  64\" (162.6 cm) Documented at 10/30/2017 1455    Admission Weight  140 lb (63.5 kg) Documented at 10/30/2017 1455          I/O this shift:  In: 30 [Other:30]  Out: -   I/O last 3 completed shifts:  In: 3510 [I.V.:3510]  Out: 1625 [Urine:1150; Other:475]    Intake/Output Summary (Last 24 hours) at 11/03/17 1458  Last data filed at 11/03/17 0800   Gross per 24 hour   Intake             2120 ml   Output             1350 ml   Net              770 ml       Physical Exam:  On vent.  Oral ETT; 30% fio2 and 5 PEEP.  RIJ TLC day 3.   Heart RRR no s3 or rub.    Lungs upper airway rhonchi; not labored    Abd scant BS, dressing in place.   No body wall edema.   Ext trace lower ext edema. Upper ext trace edema.       Results Review:      Results from last 7 days  Lab Units 11/03/17  0359 11/02/17  0240 11/01/17  1527 11/01/17  0310   SODIUM mmol/L 144 141  --  137   POTASSIUM mmol/L 4.2 3.5 3.6 3.4*   CHLORIDE mmol/L 109* 102  --  95*   CO2 mmol/L 21.3* 20.9*  --  22.5   BUN mg/dL 69* 81*  --  87*   CREATININE mg/dL 1.52* 2.32*  --  2.77*   CALCIUM mg/dL 7.2* 6.7*  --  6.9*   BILIRUBIN mg/dL 0.9 0.6  --  1.2   ALK PHOS U/L 64 53  --  50   ALT (SGPT) U/L 36* 48*  --  69*   AST (SGOT) U/L 31 37*  --  28   GLUCOSE mg/dL 111* 138*  --  137*       Estimated Creatinine Clearance: 26.9 mL/min (by C-G formula based on Cr of 1.52).      Results " from last 7 days  Lab Units 11/03/17  0359 11/02/17  0240 11/01/17  0310 10/31/17  0417   MAGNESIUM mg/dL 4.0*  --  5.5* 5.5*   PHOSPHORUS mg/dL  --  3.7 4.6* 4.7*               Results from last 7 days  Lab Units 11/03/17  0359 11/02/17  0240 11/01/17  0310 10/31/17  0417 10/30/17  2135 10/30/17  1458   WBC 10*3/mm3 9.88 12.92* 11.95* 9.54  --  13.33*   HEMOGLOBIN g/dL 12.9 13.6 14.9 16.9*  --  19.6*   HEMOGLOBIN, POC g/dL  --   --   --   --  15.3  --    PLATELETS 10*3/mm3 123* 126* 101* 107*  --  135*         Results from last 7 days  Lab Units 10/30/17  1534   INR  1.08         Imaging Results (last 24 hours)     Procedure Component Value Units Date/Time    IR Lumbar Puncture Diagnosis [[id number]] Collected:  11/02/17 1756     Updated:  11/02/17 1852    Narrative:       LUMBAR PUNCTURE FOR DIAGNOSIS UNDER FLUOROSCOPIC GUIDANCE     HISTORY: Confusion.     The patient has a large open wound of the abdomen and therefore the  examination was performed with the patient in a decubitus position.  Following sterile prep and local anesthetic, an 18-gauge needle was  advanced into the lumbar subarachnoid space at L2-3 by Dr. Melendrez.  The opening pressure is borderline elevated, measuring 190 mm of water.     15 mL of clear, colorless CSF was obtained and sent for studies as  requested. The patient tolerated the procedure well and there were no  immediate complications.     The fluoroscopy time measures 10 seconds.     This report was finalized on 11/2/2017 6:48 PM by Dr. Evens Melendrez MD.       MRI Brain Without Contrast [[id number]] Collected:  11/02/17 1705     Updated:  11/02/17 1855    Narrative:       MRI OF THE BRAIN WITHOUT CONTRAST     CLINICAL HISTORY: Found down. Not moving left side.     MRI of the brain was obtained with sagittal T1, axial T1, axial FLAIR,  axial T2, axial diffusion, and axial gradient echo images.     FINDINGS:     There are foci of acute infarction within the brain parenchyma. There is  a  focus of restricted diffusion involving the posterior limb of the  right internal capsule with extension into the genu of the internal  capsule which measures up to approximately 4.3 x 0.7 cm in greatest  axial dimensions. Furthermore, there is a focus of acute infarction  within the right uncus measuring up to 1.5 cm in diameter. These  findings are in a classic distribution of a right anterior choroidal  artery infarct. Furthermore, there are foci of infarction involving the  occipital lobes bilaterally. These are noted within the relative  inferior aspects of both occipital lobes and are likely within both  posterior cerebral artery distributions. The largest contiguous area is  seen within the right occipital lobe measuring up to approximately 2.5 x  1.2 cm in greatest axial dimensions. Areas of acute infarction are  appreciated within the left precentral gyrus measuring up to  approximately 7 mm in diameter within the left MCA distribution, the  left centrum semiovale within the left MCA distribution with the largest  of these noted within the white matter relatively posteriorly measuring  up to approximately 7-8 mm in diameter, the right postcentral gyrus  within the right MCA distribution measuring up to 6 mm in diameter, and  the right corona radiata measuring up to approximately 4 mm in diameter  also within the right MCA distribution, and the superior aspect of the  left cerebellar hemisphere measuring up to approximately 6 mm in  diameter likely within the left superior cerebellar artery distribution.  There is no evidence for hemorrhagic transformation. The major  intracranial flow related signal voids are unremarkable.     Subcentimeter foci of encephalomalacia are identified within both  cerebellar hemispheres, compatible with multiple small chronic infarcts.  Extensive changes of chronic small vessel ischemic phenomena are noted.     Fluid signal intensity is appreciated within the mastoid air  cells  bilaterally.     The midline intracranial anatomy is within normal limits.       Impression:          There are multiple foci of acute infarction identified within the brain  parenchyma. These are within multiple vascular distributions as  discussed in detail above. Given the multiple vascular distributions, a  central embolic source such as the heart, the aortic arch, or the blood  stream should be sought. Again, these foci of acute infarction are noted  within the right uncus, the posterior limb of the internal capsule  extending the genu of the internal capsule within the right anterior  choroidal artery distribution, the left precentral gyrus, the right  postcentral gyrus and the centrum semiovale bilaterally within the  middle cerebral artery distributions, both occipital lobes within the  PCA distributions, and the left cerebellar hemisphere within the left  superior cerebellar artery distribution.     Extensive changes of chronic small vessel ischemic phenomena are noted  and there are multiple small subcentimeter chronic infarcts within the  cerebellar hemispheres.     Fluid signal intensity is seen within the mastoid air cells bilaterally.     These findings were discussed with Dr. Han on 11/02/2017 at  approximately 4:45 PM.     This report was finalized on 11/2/2017 6:52 PM by Dr. Matt Lim MD.       XR Chest 1 View [[id number]] Collected:  11/03/17 0428     Updated:  11/03/17 0428    Narrative:       X-RAY CHEST 1 VIEW.     HISTORY: Dyspnea chronic.     COMPARISON: 10/31/2017.     FINDINGS:  Cardiomediastinal silhouette is within normal limits. Lines and tubes  are stable.     Blunting of both costophrenic angles remains. Improving atelectasis.          Impression:       Improving atelectasis, otherwise no significant change.                 albuterol 6 puff Inhalation Q4H - RT   heparin (porcine) 5,000 Units Subcutaneous Q8H   insulin aspart 0-24 Units Subcutaneous Q4H   lansoprazole 30 mg  Nasogastric QAM   piperacillin-tazobactam 4.5 g Intravenous Q12H   sennosides-docusate sodium 2 tablet Oral Nightly   vancomycin 1,250 mg Intravenous Q24H   Vancomycin Pharmacy Intermittent Dosing  Does not apply Daily       Pharmacy to dose vancomycin     Pharmacy to Dose Zosyn     sodium chloride 50 mL/hr Last Rate: 50 mL/hr (11/02/17 0722)       Medication Review:   Current Facility-Administered Medications   Medication Dose Route Frequency Provider Last Rate Last Dose   • acetaminophen (TYLENOL) tablet 650 mg  650 mg Oral Q4H PRN Bryan Lakhani MD        Or   • acetaminophen (TYLENOL) suppository 650 mg  650 mg Rectal Q4H PRN Bryan Lakhani MD       • albuterol (PROVENTIL HFA;VENTOLIN HFA) inhaler 6 puff  6 puff Inhalation Q4H - RT Shay Ledesam MD   6 puff at 11/03/17 1143   • dextrose (D50W) solution 25 g  25 g Intravenous Q15 Min PRN Bryan Lakhani MD       • fentaNYL citrate (PF) (SUBLIMAZE) injection 25 mcg  25 mcg Intravenous Q2H PRN Shay Ledesma MD   25 mcg at 11/03/17 1242   • glucagon (human recombinant) (GLUCAGEN DIAGNOSTIC) injection 1 mg  1 mg Subcutaneous Q15 Min PRN Bryan Lakhani MD       • heparin (porcine) 5000 UNIT/ML injection 5,000 Units  5,000 Units Subcutaneous Q8H Shay Ledesma MD       • insulin aspart (novoLOG) injection 0-24 Units  0-24 Units Subcutaneous Q4H Shay Ledesma MD   Stopped at 11/02/17 1900   • insulin aspart (novoLOG) injection 0-9 Units  0-9 Units Subcutaneous Q4H PRN Bryan Lakhani MD       • ipratropium-albuterol (DUO-NEB) nebulizer solution 3 mL  3 mL Nebulization Q2H PRN Bryan Lakhani MD       • lansoprazole (FIRST) oral suspension 30 mg  30 mg Nasogastric QAM Shay Ledesma MD   30 mg at 11/03/17 0705   • metoprolol tartrate (LOPRESSOR) injection 2.5 mg  2.5 mg Intravenous Q6H PRN Cara Orozco MD   2.5 mg at 11/03/17 1117   • Pharmacy to dose vancomycin   Does not apply Continuous PRN Shay Ledesma MD       • Pharmacy to Dose Zosyn   Does not apply Continuous PRN  Bryan Lakhani MD       • piperacillin-tazobactam (ZOSYN) 4.5 g in iso-osmotic dextrose 100 mL IVPB (premix)  4.5 g Intravenous Q12H Bryan Lakhani MD   4.5 g at 11/03/17 0705   • sennosides-docusate sodium (SENOKOT-S) 8.6-50 MG tablet 2 tablet  2 tablet Oral Nightly Bryan Lakhani MD   2 tablet at 11/02/17 2113   • sodium chloride 0.9 % flush 1-10 mL  1-10 mL Intravenous PRN Bryan Lakhani MD       • sodium chloride 0.9 % flush 10 mL  10 mL Intravenous PRN Carlito Perez MD       • sodium chloride 0.9 % flush 10 mL  10 mL Intravenous PRN Carlito Perez MD       • sodium chloride 0.9 % infusion  50 mL/hr Intravenous Continuous Cara Orozco MD 50 mL/hr at 11/02/17 0722 50 mL/hr at 11/02/17 0722   • vancomycin 1250 mg/250 mL 0.9% NS IVPB ([facility])  1,250 mg Intravenous Q24H Shay Ledesma MD       • Vancomycin Pharmacy Intermittent Dosing   Does not apply Daily Shay Ledesma MD           Assessment/Plan   1. MAURY versus MAURY on CKD 9, non-oliguric, better.  Pre-renal with urine sodium less than 20.  Vol ok, lytes compensated other than very high Mg and emerging hyperNa  2. SP Exp lap for incarcerated  Ventral hernia.  POD 4.   3. Acute on chronic respiratory failure. On vent  4. PAD, prior CVA  5. Elevated LFT's improving.   6. Altered mental status; concern for stroke  7. TCP, stable  8. 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   9. Klebsiella UTI on zosyn day 4.    10. Coag neg staph bacteremia. On vanc.   11. HTN:  lopressor iv prn for systolic over 160 or diastolic over 100.     Plan:  1.  Change to d5w with hyperNa and HTN  2.  Surveillance labs    Bartolome Valle MD  11/03/17  2:58 PM

## 2017-11-03 NOTE — PROGRESS NOTES
[facility]  Phone: [phone]  Shay Ledesma MD    Subjective:  LOS: 4    Moves right arm spontaneously but rest remains flaccid. Does not open eyes though appears awake. Remains on vent and not on sedation at this time.    Objective   Vital Signs past 24hrs  BP range: BP: (113-176)/() 151/87  Pulse range: Heart Rate:  [] 65  Resp rate range: Resp:  [18-22] 18  Temp range: Temp (24hrs), Av.2 °F (36.8 °C), Min:97.6 °F (36.4 °C), Max:99.1 °F (37.3 °C)    O2 Device: mechanical ventilator   Oxygen range:SpO2:  [92 %-100 %] 96 %   148 lb 5.9 oz (67.3 kg); Body mass index is 25.47 kg/(m^2).    Intake/Output Summary (Last 24 hours) at 17 0804  Last data filed at 17 0353   Gross per 24 hour   Intake             2090 ml   Output             1350 ml   Net              740 ml       Physical Exam   Constitutional: She appears well-developed. She is intubated.   Eyes: Conjunctivae are normal.   Neck: Normal range of motion. Neck supple.   Cardiovascular: Normal rate and regular rhythm.    No murmur heard.  Pulmonary/Chest: Effort normal. She is intubated. No respiratory distress. She has no wheezes. She has no rales.   Abdominal: Soft. She exhibits no mass. Bowel sounds are absent. There is no tenderness (does not wince to palpation).   Surgical wound   Musculoskeletal: She exhibits no edema.   Neurological:   RUE moves spontaneously  LUE winces but does not withdraw to pain  Pupils reactive   Skin: Skin is warm. No rash noted.     Results Review:    I have reviewed the laboratory and imaging data since the last note by [facility] physician.  My annotations are noted in assessment and plan.    Medication Review:  I have reviewed the current MAR.  My annotations are noted in assessment and plan.      hold 1 each    norepinephrine 0.02-0.3 mcg/kg/min Last Rate: Stopped (17 0022)   Pharmacy to dose vancomycin     Pharmacy to Dose Zosyn     phenylephrine 0.5-3 mcg/kg/min Last Rate: Stopped  (10/31/17 1830)   propofol 5-50 mcg/kg/min Last Rate: Stopped (10/31/17 4675)   sodium chloride 50 mL/hr Last Rate: 50 mL/hr (11/02/17 9822)     Plan   PCCM Problems  Acute respiratory failure, vent 10/30  Septic  shock  Bowel perf with incarc hernia, op 10/30  Multiple acute infarcts on MRI yesterday  +ve blood culture for CNS  MAURY  Elevated troponin  Elevated LFT's  ? Neuro changes  Relevant Medical Diagnoses  ? CKD  Hx CVA  Sz disorder  Cigarette smoker    Plan of Treatment  Note MRI and discussed with neuro. Likely cardio-embolic source. Also possibly +ve blood culture. Needs consideration of SARAH etc. Dtr had previously indicated discontinuation of aggressive care. However not reachable by phone at this time to discuss options wrt moving forward with further testing. Will go ahead and consult cardiology (judith since its Friday).    On vent. Tolerating PS mode. Mental status may be limitation to extubation and may require trach and peg.    Remains on fluids as per renal service. Now off pressors.    Her bowels remain silent.  She is on antibiotics.  Her urine cultures show gram-negatives.  A blood culture was coagulase-negative staph.  No cultures from the abdomen.  Continue both abx for now.  Possibly dc in a day or so if cardiac mendieta is -ve.    Need to consider nutrition. ? TPN if bowels remain silent.    I will continue to try and reach the dtr regarding goals of care now that the clinical picture is clearer wrt prognosis etc.  I was able to reach the dtr. Explained the situation. She feels her mom would not want a trach or peg. She will talk to her brothers. She may want to continue status quo through tomorrow (patient's birthday). Will await further feedback from family.    I spent +35 mins critical care time in care of this patient outside of any procedures.     Shay Ledesma MD  11/03/17  8:28 AM    Part of this note may be an electronic transcription/translation of spoken language to printed text using the  Dragon Dictation System.

## 2017-11-03 NOTE — PROGRESS NOTES
"Progress Note      PATIENT Kamryn Bradford    1934   MRN [id number]   ADMIT DATE 10/30/2017   LENGTH OF STAY 4 days   ATTENDING Shay Ledesma MD       CHIEF COMPLAINT: Shortness of Breath; Fatigue; and Vomiting      DIAGNOSIS: Incarcerated ventral hernia [K46.0]  Acute respiratory failure with hypoxia [J96.01]  Sepsis, due to unspecified organism [A41.9]  Acute renal failure, unspecified acute renal failure type [N17.9]    HISTORY OF PRESENT ILLNESS: There is been no significant change in the patient from a clinical perspective as per my discussion with the nurse and the intensivists.  Clinical seizures have been seen.  Left sided weakness persists    PHYSICAL EXAMINATION:  GENERAL: Reveals a man/woman who appears his/her stated age, in no acute distress.  VITAL SIGNS: /87  Pulse 65  Temp 97.7 °F (36.5 °C) (Oral)   Resp 18  Ht 64\" (162.6 cm)  Wt 148 lb 5.9 oz (67.3 kg)  SpO2 96%  BMI 25.47 kg/m2  NECK: Carotids are equal without bruits.   HEART: Regular rate and rhythm with no significant murmur or gallop.   EXTREMITIES: Nonedematous. Pulses are intact. There is no rash. There is no hepatosplenomegaly.   NEUROLOGIC: The patient is stuporous for ice cream her name she'll open her eyes but will not look at me she will not follow commands.  Pupils are equal and reactive.  Doll's eyes are complete horizontally today.  Corneal reflexes decreased on the left compared to the right she has some movement to supraorbital pain bilaterally but she clearly has left facial weakness.  The left arm clearly moves less than the right arm.      DIAGNOSTIC DATA: I reviewed the MRI images and agree with the interpretation in particular there is multiple bilateral infarcts in all 3 distributions.  The largest involved the anterior choroidal distribution with involvement of the internal capsule and hippocampus.    Blood cultures are positive for coagulase-negative staph    CSF shows no significant " abnormalities.    IMPRESSION AND PLAN: Multiple infarcts well explained her focality and decreased cognitive function.  Her training and thoracic echo showed no embolic source but was of limited quality.  Her blood cultures are positive for staph.  It's coag-negative but still both cultures were positive.  Therefore I think the possibility of an infective endocarditis needs to be considered.  I would therefore consider transesophageal echo.  I discussed this with Dr. Harrison and he is going to have a talk with the family concerning how aggressive they want a day.  However if we do not go to palliative then I think a SARAH is indicated.  She is already on antibiotics at this point.  I don't think a prolonged EEG monitor is required at this point.        Genaro Han MD  11/3/2017  8:51 AM

## 2017-11-03 NOTE — PROGRESS NOTES
"Pharmacokinetic Consult - Vancomycin and Zosyn Dosing (Follow-up Note)    Kamryn Bradford is on day 4 pharmacy to dose vancomycin for sepsis.  Pharmacy dosing vancomycin per Dr Ledesma/Dr Lakhani's request.   Vancomycin Goal trough: 15-20 mg/L     Zosyn Pharmacy to Dose Day 5     Relevant clinical data and objective history reviewed:  82 y.o. female 64\" (162.6 cm) 148 lb 5.9 oz (67.3 kg)    Past Medical History:   Diagnosis Date   • Hernia of abdominal wall    • PONV (postoperative nausea and vomiting)    • Seizures    • Stroke      Creatinine   Date Value Ref Range Status   11/03/2017 1.52 (H) 0.57 - 1.00 mg/dL Final   11/02/2017 2.32 (H) 0.57 - 1.00 mg/dL Final   11/01/2017 2.77 (H) 0.57 - 1.00 mg/dL Final     BUN   Date Value Ref Range Status   11/03/2017 69 (H) 8 - 23 mg/dL Final     Estimated Creatinine Clearance: 26.9 mL/min (by C-G formula based on Cr of 1.52).    Lab Results   Component Value Date    WBC 9.88 11/03/2017     Temp Readings from Last 3 Encounters:   11/03/17 98.3 °F (36.8 °C) (Oral)     Cultures:   10/30 blood cx 1 of 2 coag negative staph  10/30 urine cx >100k cfu K. pneumo  11/2 CSF lumbar puncture= NG <24 hours     Dosing hx (include troughs if drawn):  10/[id number]: 1gm IV x1  11/1 0310 random level: 18.4 mcg/ml; 1000mg IV x1 @ 1539  11/2 0240 random level: 24.9 mcg/ml; 1349 random level: 19.5 mcg/ml          Vancomycin 500mg IV x1 1800  11/3 1242 random level= 13.9    IV Anti-Infectives     Ordered     Dose/Rate Route Frequency Start Stop    11/02/17 1521  vancomycin 500 mg/100 mL 0.9% NS IVPB (mbp)     Ordering Provider:  Shay Ledesma MD    500 mg  over 50 Minutes Intravenous Once 11/02/17 1600 11/02/17 1850    11/01/17 1417  vancomycin (VANCOCIN) in iso-osmotic dextrose IVPB 1 g (premix) 200 mL     Ordering Provider:  Shay Ledesma MD    1,000 mg  over 100 Minutes Intravenous Once 11/01/17 1500 11/01/17 1719    10/31/17 1246  piperacillin-tazobactam (ZOSYN) 4.5 g in iso-osmotic dextrose 100 mL IVPB " (premix)     Ordering Provider:  Bryan Lakhani MD    4.5 g  over 4 Hours Intravenous Every 12 Hours 10/31/17 1730      10/31/17 1613  Vancomycin Pharmacy Intermittent Dosing     Ordering Provider:  Shay Ledesma MD     Does not apply Daily 10/31/17 1645      10/31/17 1556  vancomycin (VANCOCIN) in iso-osmotic dextrose IVPB 1 g (premix) 200 mL     Ordering Provider:  Shay Ledesma MD    15 mg/kg × 66.2 kg  over 120 Minutes Intravenous Once 10/31/17 1630 10/31/17 1905    10/31/17 1539  Pharmacy to dose vancomycin     Ordering Provider:  Shay Ledesma MD     Does not apply Continuous PRN 10/31/17 1538      10/30/17 1949  Pharmacy to Dose Zosyn     Ordering Provider:  Bryan Lakhani MD     Does not apply Continuous PRN 10/30/17 1949      10/30/17 1634  piperacillin-tazobactam (ZOSYN) 4.5 g in iso-osmotic dextrose 100 mL IVPB (premix)     Ordering Provider:  Carlito Perez MD    4.5 g  over 0.5 Hours Intravenous Once 10/30/17 1636 10/30/17 1717           Lab Results   Component Value Date    VANCORANDOM 13.90 11/03/2017     Assessment:  Pt is s/p ex lap with small bowel resection for incarcerated ventral hernia. No cultures from abdominal source available. Per Dr. Ledesma on 11/2, cont vanco for at least 1 more day.  Dr Ledesma in [facility] states to continue vancomycin for now.     Plan:  1) Per dosing history above and renal function improving:  begin vancomycin 1250mg IV q 24 H.  2) Trough level on 11/5 if pt to remain on vancomycin.   3) Continue current dose Zosyn 4.5gm IV q12h per extended infusion protocol.  Pharmacy will continue to follow and adjust as needed.    Jessenia Kilpatrick,PharmD

## 2017-11-03 NOTE — PLAN OF CARE
Problem: SAFETY - NON-VIOLENT RESTRAINT  Goal: Remains free of injury from restraints (Non-Violent Restraint)  Outcome: Ongoing (interventions implemented as appropriate)  Restraints changed to one sided, as patient has not demonstrated any use or movement of the left arm.  She is still able with the right arm still able to reach up and connect with the NG and ET tubes.  Will continue to provide ROM, assessment, and monitor.  Goal: Free from restraint(s) (Non-Violent Restraint)  Outcome: Ongoing (interventions implemented as appropriate)

## 2017-11-04 NOTE — PROGRESS NOTES
"[facility] progress note.    Discussed with patients daughter at bedside.  She is comfortable wth the decision to pursue comfor tmeasures only.  She has been through this path recently with her  and father so understands palliative care and that we will only be pursuing comfort measures and have no Medicines to prolong any suffering.  She also says we will not be doing lab draws.  We also discussed that we'll be transferring her out of the intensive care unit to the palliative care floor.  She actually is very familiar with for [facility] and had been there with family members previously.  She is appropriately sad but a piece with her decision.    BP (!) 185/109  Pulse 82  Temp 98.3 °F (36.8 °C) (Oral)   Resp 18  Ht 64\" (162.6 cm)  Wt 148 lb 5.9 oz (67.3 kg)  SpO2 93%  BMI 25.47 kg/m2  In no distress ill appearing  No resp distress symmetric air entry  Tachy      Results from last 7 days  Lab Units 11/03/17  0359 11/02/17  0240 11/01/17  0310 10/31/17  0417 10/30/17  2135 10/30/17  1458   WBC 10*3/mm3 9.88 12.92* 11.95* 9.54  --  13.33*   HEMOGLOBIN g/dL 12.9 13.6 14.9 16.9*  --  19.6*   HEMOGLOBIN, POC g/dL  --   --   --   --  15.3  --    PLATELETS 10*3/mm3 123* 126* 101* 107*  --  135*     Results from last 7 days  Lab Units 11/03/17  0359 11/02/17  0240 11/01/17  1527 11/01/17  0310 10/31/17  0417 10/30/17  1534   SODIUM mmol/L 144 141  --  137 133* 128*   POTASSIUM mmol/L 4.2 3.5 3.6 3.4* 3.6 3.9   CHLORIDE mmol/L 109* 102  --  95* 91* 79*   CO2 mmol/L 21.3* 20.9*  --  22.5 27.0 27.6   BUN mg/dL 69* 81*  --  87* 81* 83*   CREATININE mg/dL 1.52* 2.32*  --  2.77* 2.40* 2.55*   GLUCOSE mg/dL 111* 138*  --  137* 120* 250*   CALCIUM mg/dL 7.2* 6.7*  --  6.9* 7.1* 8.9   MAGNESIUM mg/dL 4.0*  --   --  5.5* 5.5*  --    PHOSPHORUS mg/dL  --  3.7  --  4.6* 4.7*  --    Estimated Creatinine Clearance: 26.4 mL/min (by C-G formula based on Cr of 1.52).      A/p:  Comfort measures:  transfer out of ccu to [address] [place] [address]" Lesvia RAMIRES  [facility]  11/04/17  3:11 PM

## 2017-11-04 NOTE — PROGRESS NOTES
Chief Complaint: POD # 5    Subjective   Pt in no distress  Objective     Vital signs in last 24 hours:  Heart Rate:  [70-96] 82  Resp:  [16-20] 18  BP: (166-187)/() 185/109    Intake/Output last 3 shifts:  I/O last 3 completed shifts:  In: 2080 [I.V.:2050; Other:30]  Out: 1375 [Urine:1175; Other:200]    Intake/Output this shift:  I/O this shift:  In: -   Out: 325 [Urine:325]    Physical Exam:  Respiratory: CTA, good inspiratory effort  CV: tachycardiac   Abd: +  BS, soft, ND, usual post-op tenderness, no rebound, no guarding    Assessment/Plan   S/P The patient is POD 4 from an exploratory laparotomy with small bowel resection for perforated small bowel in an incarcerated ventral hernia.    Addis Santana MD

## 2017-11-04 NOTE — PLAN OF CARE
Problem: Patient Care Overview (Adult)  Goal: Plan of Care Review  Outcome: Ongoing (interventions implemented as appropriate)    11/04/17 1803   Coping/Psychosocial Response Interventions   Plan Of Care Reviewed With patient;daughter   Patient Care Overview   Progress declining   Outcome Evaluation   Outcome Summary/Follow up Plan pt transferred to [facility]. pt given fentanyl and ativan prior to activity. pt responsive only to pain. educated family on palliative care. continue to monitor.       Goal: Adult Individualization and Mutuality  Outcome: Ongoing (interventions implemented as appropriate)  Goal: Discharge Needs Assessment  Outcome: Ongoing (interventions implemented as appropriate)    Problem: Skin Integrity Impairment, Risk/Actual (Adult)  Goal: Identify Related Risk Factors and Signs and Symptoms  Outcome: Ongoing (interventions implemented as appropriate)  Goal: Skin Integrity/Wound Healing  Outcome: Ongoing (interventions implemented as appropriate)    Problem: Dying Patient, Actively (Adult)  Goal: Comfort/Pain Control  Outcome: Ongoing (interventions implemented as appropriate)  Goal: Dying Process, Peace and Dignity  Outcome: Ongoing (interventions implemented as appropriate)

## 2017-11-05 NOTE — PLAN OF CARE
Problem: Patient Care Overview (Adult)  Goal: Plan of Care Review  Outcome: Ongoing (interventions implemented as appropriate)    11/05/17 1810   Coping/Psychosocial Response Interventions   Plan Of Care Reviewed With patient   Patient Care Overview   Progress declining   Outcome Evaluation   Outcome Summary/Follow up Plan .Pt turned 9,1, 5. Pt premedicated before turns. scope patch will be plced. Continue comfort care.          Goal: Adult Individualization and Mutuality  Outcome: Ongoing (interventions implemented as appropriate)  Goal: Discharge Needs Assessment  Outcome: Ongoing (interventions implemented as appropriate)    Problem: Skin Integrity Impairment, Risk/Actual (Adult)  Goal: Identify Related Risk Factors and Signs and Symptoms  Outcome: Ongoing (interventions implemented as appropriate)  Goal: Skin Integrity/Wound Healing  Outcome: Ongoing (interventions implemented as appropriate)    Problem: Dying Patient, Actively (Adult)  Goal: Identify Related Risk Factors and Signs and Symptoms  Outcome: Ongoing (interventions implemented as appropriate)  Goal: Comfort/Pain Control  Outcome: Ongoing (interventions implemented as appropriate)  Goal: Dying Process, Peace and Dignity  Outcome: Ongoing (interventions implemented as appropriate)

## 2017-11-05 NOTE — PROGRESS NOTES
"  Daily Progress Note.   [address]  11/5/2017    Patient:  Name:  Kamryn Bradford  MRN:  [id number]  1934  83 y.o.  female         Interval History:  Unresponsive in bed,   No family present at current time.    Physical Exam:  /79  Pulse 101  Temp 98.8 °F (37.1 °C) (Oral)   Resp 18  Ht 64\" (162.6 cm)  Wt 148 lb 5.9 oz (67.3 kg)  SpO2 93%  BMI 25.47 kg/m2  Body mass index is 25.47 kg/(m^2).    Intake/Output Summary (Last 24 hours) at 11/05/17 6981  Last data filed at 11/05/17 0411   Gross per 24 hour   Intake                0 ml   Output              600 ml   Net             -600 ml     Unresponsive  Upper airway rhonchi audible  Slight increased resp effort  No spont movement    Scheduled meds:       Data Review:   I reviewed the patient's medications, imaging and new clinical results.    Results from last 7 days  Lab Units 11/03/17  0359 11/02/17  0240 11/01/17  0310 10/31/17  0417 10/30/17  2135 10/30/17  1458   WBC 10*3/mm3 9.88 12.92* 11.95* 9.54  --  13.33*   HEMOGLOBIN g/dL 12.9 13.6 14.9 16.9*  --  19.6*   HEMOGLOBIN, POC g/dL  --   --   --   --  15.3  --    PLATELETS 10*3/mm3 123* 126* 101* 107*  --  135*     Results from last 7 days  Lab Units 11/03/17 0359 11/02/17 0240 11/01/17  1527 11/01/17  0310 10/31/17  0417 10/30/17  1534   SODIUM mmol/L 144 141  --  137 133* 128*   POTASSIUM mmol/L 4.2 3.5 3.6 3.4* 3.6 3.9   CHLORIDE mmol/L 109* 102  --  95* 91* 79*   CO2 mmol/L 21.3* 20.9*  --  22.5 27.0 27.6   BUN mg/dL 69* 81*  --  87* 81* 83*   CREATININE mg/dL 1.52* 2.32*  --  2.77* 2.40* 2.55*   GLUCOSE mg/dL 111* 138*  --  137* 120* 250*   CALCIUM mg/dL 7.2* 6.7*  --  6.9* 7.1* 8.9   MAGNESIUM mg/dL 4.0*  --   --  5.5* 5.5*  --    PHOSPHORUS mg/dL  --  3.7  --  4.6* 4.7*  --    Estimated Creatinine Clearance: 26.4 mL/min (by C-G formula based on Cr of 1.52).    Imaging Results (most recent)     Procedure Component Value Units Date/Time    XR Chest 1 View [[id number]] " Collected:  10/30/17 1613     Updated:  10/30/17 1647    Narrative:       ONE VIEW PORTABLE CHEST     HISTORY: Shortness of breath.     The lungs are well-expanded and clear except for some very minimal  linear scarring in both upper lobes. The heart size is normal.     This report was finalized on 10/30/2017 4:44 PM by Dr. Evens Melendrez MD.       CT Abdomen Pelvis Without Contrast [[id number]] Collected:  10/30/17 1858     Updated:  10/30/17 1956    Narrative:       CT SCAN OF THE ABDOMEN AND PELVIS WITHOUT CONTRAST     HISTORY: Large ventral hernia. Abdominal pain.     FINDINGS: The CT scan was performed as an emergency procedure through  the abdomen and pelvis without contrast and demonstrates the followin. There are 2 separate ventral hernias present. The first hernia  located more cephalad has a relatively wide neck and contains mesenteric  fat. The second ventral hernia is just below this level and extends more  to the right and has a narrow neck containing mesenteric fat as well as  a knuckle of what appears to be small bowel. There are multiple dilated  fluid-filled small bowel loops and I suspect this represents an  incarcerated hernia. In addition, there are several tiny bubbles of free  air located anteriorly. This is therefore concerning for bowel  perforation. It might possibly relate to ischemic bowel in this setting  and further clinical correlation is required.        2. There is a third hernia in the right lower quadrant that has a wide  neck and contains several loops of nondilated small bowel.  3. The lung bases are clear. The liver, spleen, both adrenal glands, and  both kidneys are unremarkable without intravenous contrast. The pancreas  is atrophic.     4. There is a prominent infrarenal abdominal aortic aneurysm measuring  up to 4.8 cm in diameter. There is no retroperitoneal lymphadenopathy.  5. There is a large ovoid cyst in the left pelvis most likely  representing a large left  ovarian cyst measuring 7.8 cm. No complicating  features are seen but a cystic ovarian tumor cannot be excluded in this  age group and continued surveillance is therefore recommended. The  remainder of the pelvis is unremarkable.     Radiation dose reduction techniques were utilized, including automated  exposure control and exposure modulation based on body size.     This report was finalized on 10/30/2017 7:53 PM by Dr. Evens Melendrez MD.       XR Chest 1 View [[id number]] Collected:  10/31/17 0004     Updated:  11/01/17 0216    Narrative:       X-RAY CHEST 1 VIEW.     HISTORY: ET tube placement.     COMPARISON: 10/30/2017.     FINDINGS:  Cardiomediastinal silhouette is within normal limits. ET tube is in  position, tip is 2.5 cm from the micaela. Nasogastric tube is in good  position.     Low lung volumes and atelectasis of the left upper lobe.              Impression:       No acute findings. Tip of the ET tube is 2.5 cm from the micaela.     This report was finalized on 11/1/2017 2:12 AM by Dr. Richard Davila MD.       XR Chest Post CVA Port [[id number]] Collected:  10/31/17 0418     Updated:  11/01/17 0240    Narrative:       X-RAY CHEST POST CVA PORT.     HISTORY: Central line placement.     COMPARISON: 10/30/2017.     FINDINGS:  Cardiomediastinal silhouette is within normal limits. Right jugular  catheter has been placed, no pneumothorax, ET tube and NG tube remain  unchanged.     There is no consolidation or effusion. Mild atelectasis, lung volumes  are low.          Impression:       1.  Right jugular catheter tip is in the right atrium, no pneumothorax.  ET tube and NG tube remain unchanged.   2.  Low lung volumes and mild bilateral atelectasis.     This report was finalized on 11/1/2017 2:37 AM by Dr. Richard Davila MD.       CT Head Without Contrast [[id number]] Collected:  11/01/17 1343     Updated:  11/01/17 1642    Narrative:       CT SCAN OF THE HEAD WITHOUT CONTRAST     CLINICAL HISTORY: Neurologic  changes. Unresponsive.     CT scan of the head was obtained with 3 mm axial images. No intravenous  contrast was administered.     FINDINGS:     The ventricles, sulci, and cisterns are age appropriate. The basal  ganglia and thalami are unremarkable in appearance. Moderate changes of  chronic small vessel ischemic phenomena are noted. Incidentally noted  are subcentimeter chronic infarcts within both cerebellar hemispheres.  Atherosclerotic changes are appreciated within the intracranial  vasculature.     Incidentally noted is a chronic appearing fracture involving the right  mandibular condyle. A feeding tube is incidentally noted within the  right aspect of the nasal cavity.       Impression:          Moderate changes of chronic small vessel ischemic phenomena are noted.  Otherwise, there is no evidence for acute intracranial pathology.     Radiation dose reduction techniques were utilized, including automated  exposure control and exposure modulation based on body size.     This report was finalized on 11/1/2017 4:39 PM by Dr. Matt Lim MD.       IR Lumbar Puncture Diagnosis [[id number]] Collected:  11/02/17 1756     Updated:  11/02/17 1852    Narrative:       LUMBAR PUNCTURE FOR DIAGNOSIS UNDER FLUOROSCOPIC GUIDANCE     HISTORY: Confusion.     The patient has a large open wound of the abdomen and therefore the  examination was performed with the patient in a decubitus position.  Following sterile prep and local anesthetic, an 18-gauge needle was  advanced into the lumbar subarachnoid space at L2-3 by Dr. Melendrez.  The opening pressure is borderline elevated, measuring 190 mm of water.     15 mL of clear, colorless CSF was obtained and sent for studies as  requested. The patient tolerated the procedure well and there were no  immediate complications.     The fluoroscopy time measures 10 seconds.     This report was finalized on 11/2/2017 6:48 PM by Dr. Evens Melendrez MD.       MRI Brain Without Contrast  [[id number]] Collected:  11/02/17 1705     Updated:  11/02/17 5725    Narrative:       MRI OF THE BRAIN WITHOUT CONTRAST     CLINICAL HISTORY: Found down. Not moving left side.     MRI of the brain was obtained with sagittal T1, axial T1, axial FLAIR,  axial T2, axial diffusion, and axial gradient echo images.     FINDINGS:     There are foci of acute infarction within the brain parenchyma. There is  a focus of restricted diffusion involving the posterior limb of the  right internal capsule with extension into the genu of the internal  capsule which measures up to approximately 4.3 x 0.7 cm in greatest  axial dimensions. Furthermore, there is a focus of acute infarction  within the right uncus measuring up to 1.5 cm in diameter. These  findings are in a classic distribution of a right anterior choroidal  artery infarct. Furthermore, there are foci of infarction involving the  occipital lobes bilaterally. These are noted within the relative  inferior aspects of both occipital lobes and are likely within both  posterior cerebral artery distributions. The largest contiguous area is  seen within the right occipital lobe measuring up to approximately 2.5 x  1.2 cm in greatest axial dimensions. Areas of acute infarction are  appreciated within the left precentral gyrus measuring up to  approximately 7 mm in diameter within the left MCA distribution, the  left centrum semiovale within the left MCA distribution with the largest  of these noted within the white matter relatively posteriorly measuring  up to approximately 7-8 mm in diameter, the right postcentral gyrus  within the right MCA distribution measuring up to 6 mm in diameter, and  the right corona radiata measuring up to approximately 4 mm in diameter  also within the right MCA distribution, and the superior aspect of the  left cerebellar hemisphere measuring up to approximately 6 mm in  diameter likely within the left superior cerebellar artery distribution.  There  is no evidence for hemorrhagic transformation. The major  intracranial flow related signal voids are unremarkable.     Subcentimeter foci of encephalomalacia are identified within both  cerebellar hemispheres, compatible with multiple small chronic infarcts.  Extensive changes of chronic small vessel ischemic phenomena are noted.     Fluid signal intensity is appreciated within the mastoid air cells  bilaterally.     The midline intracranial anatomy is within normal limits.       Impression:          There are multiple foci of acute infarction identified within the brain  parenchyma. These are within multiple vascular distributions as  discussed in detail above. Given the multiple vascular distributions, a  central embolic source such as the heart, the aortic arch, or the blood  stream should be sought. Again, these foci of acute infarction are noted  within the right uncus, the posterior limb of the internal capsule  extending the genu of the internal capsule within the right anterior  choroidal artery distribution, the left precentral gyrus, the right  postcentral gyrus and the centrum semiovale bilaterally within the  middle cerebral artery distributions, both occipital lobes within the  PCA distributions, and the left cerebellar hemisphere within the left  superior cerebellar artery distribution.     Extensive changes of chronic small vessel ischemic phenomena are noted  and there are multiple small subcentimeter chronic infarcts within the  cerebellar hemispheres.     Fluid signal intensity is seen within the mastoid air cells bilaterally.     These findings were discussed with Dr. Han on 11/02/2017 at  approximately 4:45 PM.     This report was finalized on 11/2/2017 6:52 PM by Dr. Matt Lim MD.       XR Chest 1 View [[id number]] Collected:  11/03/17 0428     Updated:  11/04/17 0027    Narrative:       X-RAY CHEST 1 VIEW.     HISTORY: Dyspnea chronic.     COMPARISON: 10/31/2017.      FINDINGS:  Cardiomediastinal silhouette is within normal limits. Lines and tubes  are stable.     Blunting of both costophrenic angles remains. Improving atelectasis.          Impression:       Improving atelectasis, otherwise no significant change.         This report was finalized on 11/4/2017 12:23 AM by Dr. Richard Davila MD.             ASSESSMENT  /  PLAN:  Respiratory Failure  Septic shock  Stroke  MAURY    -continue palliative measures.  No family at bedside  Will consult [facility] tomorrow if okay with family. (has been discussed with them)  Will increase medication for braethlessness and antisecretory - has some audible rhonchi and breathing effort increased.  Non arousable      David Lakhani MD  [facility]  11/05/17  5:47 PM

## 2017-11-05 NOTE — PLAN OF CARE
Problem: Patient Care Overview (Adult)  Goal: Plan of Care Review  Outcome: Ongoing (interventions implemented as appropriate)  Continue symptom management and comfort care    11/04/17 1803 11/04/17 2019 11/05/17 0453   Coping/Psychosocial Response Interventions   Plan Of Care Reviewed With --  patient --    Patient Care Overview   Progress declining --  --    Outcome Evaluation   Outcome Summary/Follow up Plan --  --  Patient unresponsive. Medication given Q4 hours with turns. No S&S of pain or anxiety.       Goal: Adult Individualization and Mutuality  Outcome: Ongoing (interventions implemented as appropriate)  Goal: Discharge Needs Assessment  Outcome: Ongoing (interventions implemented as appropriate)    Problem: Skin Integrity Impairment, Risk/Actual (Adult)  Goal: Identify Related Risk Factors and Signs and Symptoms  Outcome: Ongoing (interventions implemented as appropriate)  Goal: Skin Integrity/Wound Healing  Outcome: Ongoing (interventions implemented as appropriate)    Problem: Dying Patient, Actively (Adult)  Goal: Identify Related Risk Factors and Signs and Symptoms  Outcome: Ongoing (interventions implemented as appropriate)  Goal: Comfort/Pain Control  Outcome: Ongoing (interventions implemented as appropriate)  Goal: Dying Process, Peace and Dignity  Outcome: Ongoing (interventions implemented as appropriate)

## 2017-11-06 NOTE — PROGRESS NOTES
Discharge Planning Assessment  [facility]     Patient Name: Kamryn Bradford  MRN: [id number]  Today's Date: 11/6/2017    Admit Date: 10/30/2017          Discharge Needs Assessment     None            Discharge Plan       11/06/17 1341    Case Management/Social Work Plan    Additional Comments The patient was transferred to [facility] from CCU on 11/4/17 @14:48. The patient is pallitive. CCP will follow for any needs that may arise. KATELIN Queen RN, CCP.        Discharge Placement     No information found                Demographic Summary     None            Functional Status     None            Psychosocial     None            Abuse/Neglect     None            Legal     None            Substance Abuse     None            Patient Forms     None          Mariza Queen, RN

## 2017-11-06 NOTE — PLAN OF CARE
Problem: Patient Care Overview (Adult)  Goal: Plan of Care Review  Outcome: Ongoing (interventions implemented as appropriate)    11/06/17 7964   Coping/Psychosocial Response Interventions   Plan Of Care Reviewed With patient   Patient Care Overview   Progress declining   Outcome Evaluation   Outcome Summary/Follow up Plan Pt turned 9,1, 5. Pt premedicated before turns. pt doing well in recovery postion. Continue comfort care.         Problem: Skin Integrity Impairment, Risk/Actual (Adult)  Goal: Identify Related Risk Factors and Signs and Symptoms  Outcome: Ongoing (interventions implemented as appropriate)  Goal: Skin Integrity/Wound Healing  Outcome: Ongoing (interventions implemented as appropriate)    Problem: Dying Patient, Actively (Adult)  Goal: Identify Related Risk Factors and Signs and Symptoms  Outcome: Ongoing (interventions implemented as appropriate)  Goal: Comfort/Pain Control  Outcome: Ongoing (interventions implemented as appropriate)  Goal: Dying Process, Peace and Dignity  Outcome: Ongoing (interventions implemented as appropriate)

## 2017-11-06 NOTE — NURSING NOTE
Patient son ruthie in room and he is feeling dizzy, and lightheaded. Checked pt BP and was 172/112. ruthie denies going to ER. Will call his sister and seeif they can bring his BP med

## 2017-11-07 NOTE — PROGRESS NOTES
Case Management Discharge Note    Final Note: The patient  on 17 @ 18:17. KATELIN Queen RN, CCP.     Discharge Placement     No information found             Discharge Codes: 20

## 2017-11-10 NOTE — DISCHARGE SUMMARY
[facility]  Discharge Summary    Date of Admission: 10/30/2017  Date of Death: 11/6/17 @ 18:17.    PCP: Nehemias Bradford MD    Principle Cause of Death:   Sepsis  Respiratory Failure    Secondary Diagnoses:   Principal Problem:    Acute on chronic respiratory failure with hypoxia  Active Problems:    Incarcerated ventral hernia    Acute respiratory failure with hypoxia        Presenting Problem/History of Present Illness  Incarcerated ventral hernia [K46.0]  Acute respiratory failure with hypoxia [J96.01]  Sepsis, due to unspecified organism [A41.9]  Acute renal failure, unspecified acute renal failure type [N17.9]      Hospital Course  Patient was a 83 y.o. female who presented to [facility] and was admitted to [facility] suffered from bowel perforation from incarcerated hernia multiple acute infarcts to her brain acute respiratory failure requiring mechanical ventilation as well as an elevation in her cardiac enzymes.  After discussion with the family they decided given the poor prognosis of a meaningful recovery to pursue bout of care measures.  Her care was transitioned to comfort measures where she passed and comfort      David Lakhani MD  11/10/17  4:34 PM

## (undated) DEVICE — SUT ETHLN 2/0 PS 18IN 585H

## (undated) DEVICE — PK PROC MAJ 40

## (undated) DEVICE — MEDI-VAC YANKAUER SUCTION HANDLE W/BULBOUS TIP: Brand: CARDINAL HEALTH

## (undated) DEVICE — ANTIBACTERIAL UNDYED BRAIDED (POLYGLACTIN 910), SYNTHETIC ABSORBABLE SUTURE: Brand: COATED VICRYL

## (undated) DEVICE — TOTAL TRAY, 16FR 10ML SIL FOLEY, URN: Brand: MEDLINE

## (undated) DEVICE — BANDAGE,GAUZE,BULKEE II,4.5"X4.1YD,STRL: Brand: MEDLINE

## (undated) DEVICE — IRRIGATOR BULB ASEPTO 60CC STRL

## (undated) DEVICE — SUT PDS 1 CT1 36IN Z347H

## (undated) DEVICE — PAD,ABDOMINAL,8"X10",ST,LF: Brand: MEDLINE

## (undated) DEVICE — ELECTRD BLD EDGE/STD 1P 2.4X6.35MM STRL

## (undated) DEVICE — POOLE SUCTION HANDLE: Brand: CARDINAL HEALTH

## (undated) DEVICE — SUT SILK 2/0 TIES 18IN A185H

## (undated) DEVICE — SUT SILK 3/0 SH CR5 18IN C0135

## (undated) DEVICE — APPL CHLORAPREP W/TINT 26ML ORNG

## (undated) DEVICE — SUT SILK 3/0 TIES 18IN A184H

## (undated) DEVICE — ENCORE® LATEX ORTHO SIZE 7.5, STERILE LATEX POWDER-FREE SURGICAL GLOVE: Brand: ENCORE

## (undated) DEVICE — SUT SILK 2/0 SH CR5 18IN C0125